# Patient Record
Sex: FEMALE | Race: WHITE | Employment: OTHER | URBAN - METROPOLITAN AREA
[De-identification: names, ages, dates, MRNs, and addresses within clinical notes are randomized per-mention and may not be internally consistent; named-entity substitution may affect disease eponyms.]

---

## 2017-02-08 ENCOUNTER — HOSPITAL ENCOUNTER (OUTPATIENT)
Dept: MRI IMAGING | Age: 52
Discharge: HOME | End: 2017-02-08
Attending: ORTHOPAEDIC SURGERY
Payer: MEDICAID

## 2017-02-08 DIAGNOSIS — M54.16 LUMBAR RADICULOPATHY: ICD-10-CM

## 2017-02-08 PROCEDURE — 72148 MRI LUMBAR SPINE W/O DYE: CPT

## 2017-04-26 RX ORDER — HYDROMORPHONE HYDROCHLORIDE 8 MG/1
8 TABLET ORAL
Qty: 60 TAB | Refills: 0 | Status: SHIPPED | OUTPATIENT
Start: 2017-04-26 | End: 2017-05-24 | Stop reason: SDUPTHER

## 2017-04-26 NOTE — TELEPHONE ENCOUNTER
PATIENT WAS CALLING CHECKING ON APPT TIME AND I TOLD HER 2:40PM. PT THOUGHT IT WAS 3:40PM. I TOLD HER SHE WOULD HAVE TO [address] IF SHE WAS 15MIN LATE AND SHE KNOWS SHE WILL BE. PLEASE PREPARE RX AND SHE WILL [address]. .. [address]    PATIENT REQUESTING REFILL ON DILAUDID 8MG #60  ONE PO Q8HRS PRN FOR SEVERE PAIN

## 2017-05-24 ENCOUNTER — OFFICE VISIT (OUTPATIENT)
Dept: NEUROLOGY | Age: 52
End: 2017-05-24

## 2017-05-24 VITALS
TEMPERATURE: 98.6 F | RESPIRATION RATE: 18 BRPM | OXYGEN SATURATION: 96 % | SYSTOLIC BLOOD PRESSURE: 119 MMHG | WEIGHT: 226 LBS | HEART RATE: 112 BPM | HEIGHT: 68 IN | DIASTOLIC BLOOD PRESSURE: 73 MMHG | BODY MASS INDEX: 34.25 KG/M2

## 2017-05-24 DIAGNOSIS — R00.0 TACHYCARDIA: ICD-10-CM

## 2017-05-24 DIAGNOSIS — Z76.0 MEDICATION REFILL: Primary | ICD-10-CM

## 2017-05-24 DIAGNOSIS — M54.42 CHRONIC BILATERAL LOW BACK PAIN WITH BILATERAL SCIATICA: ICD-10-CM

## 2017-05-24 DIAGNOSIS — R26.9 GAIT DISORDER: ICD-10-CM

## 2017-05-24 DIAGNOSIS — F11.90 CHRONIC, CONTINUOUS USE OF OPIOIDS: ICD-10-CM

## 2017-05-24 DIAGNOSIS — M54.41 CHRONIC BILATERAL LOW BACK PAIN WITH BILATERAL SCIATICA: ICD-10-CM

## 2017-05-24 DIAGNOSIS — M79.18 MYOFASCIAL MUSCLE PAIN: ICD-10-CM

## 2017-05-24 DIAGNOSIS — G89.29 CHRONIC BILATERAL LOW BACK PAIN WITH BILATERAL SCIATICA: ICD-10-CM

## 2017-05-24 DIAGNOSIS — G62.9 POLYNEUROPATHY: ICD-10-CM

## 2017-05-24 DIAGNOSIS — G89.4 CHRONIC PAIN DISORDER: ICD-10-CM

## 2017-05-24 DIAGNOSIS — M54.16 LUMBAR RADICULOPATHY: ICD-10-CM

## 2017-05-24 RX ORDER — METHYLPREDNISOLONE 4 MG/1
TABLET ORAL
Refills: 0 | COMMUNITY
Start: 2017-03-20 | End: 2022-05-15

## 2017-05-24 RX ORDER — BENZONATATE 100 MG/1
CAPSULE ORAL
Refills: 1 | COMMUNITY
Start: 2017-03-20

## 2017-05-24 RX ORDER — DIAZEPAM 10 MG/1
10 TABLET ORAL 2 TIMES DAILY
Qty: 60 TAB | Refills: 3 | Status: SHIPPED | OUTPATIENT
Start: 2017-05-24 | End: 2017-08-18 | Stop reason: SDUPTHER

## 2017-05-24 RX ORDER — TRAMADOL HYDROCHLORIDE 50 MG/1
TABLET ORAL
Refills: 0 | COMMUNITY
Start: 2017-05-05 | End: 2022-05-15

## 2017-05-24 RX ORDER — SENNOSIDES/DOCUSATE SODIUM 8.6MG-50MG
TABLET ORAL
Refills: 11 | COMMUNITY
Start: 2017-03-26

## 2017-05-24 RX ORDER — HYDROCODONE BITARTRATE AND ACETAMINOPHEN 10; 325 MG/1; MG/1
TABLET ORAL
Refills: 0 | COMMUNITY
Start: 2017-05-09 | End: 2022-05-15

## 2017-05-24 RX ORDER — ALBUTEROL SULFATE 90 UG/1
AEROSOL, METERED RESPIRATORY (INHALATION)
Refills: 2 | COMMUNITY
Start: 2017-04-29

## 2017-05-24 RX ORDER — DOXYCYCLINE 100 MG/1
CAPSULE ORAL
Refills: 0 | COMMUNITY
Start: 2017-05-03 | End: 2017-05-24 | Stop reason: ALTCHOICE

## 2017-05-24 RX ORDER — DIAZEPAM 10 MG/1
TABLET ORAL
Refills: 3 | COMMUNITY
Start: 2017-05-15 | End: 2017-05-24 | Stop reason: SDUPTHER

## 2017-05-24 RX ORDER — VALSARTAN AND HYDROCHLOROTHIAZIDE 80; 12.5 MG/1; MG/1
TABLET, FILM COATED ORAL
Refills: 5 | COMMUNITY
Start: 2017-05-11

## 2017-05-24 RX ORDER — CYCLOBENZAPRINE HCL 5 MG
TABLET ORAL
Refills: 0 | COMMUNITY
Start: 2017-05-09 | End: 2017-11-20 | Stop reason: SDUPTHER

## 2017-05-24 RX ORDER — METFORMIN HYDROCHLORIDE 500 MG/1
TABLET ORAL
Refills: 3 | COMMUNITY
Start: 2017-04-29

## 2017-05-24 RX ORDER — OXYCODONE AND ACETAMINOPHEN 5; 325 MG/1; MG/1
TABLET ORAL
Refills: 0 | COMMUNITY
Start: 2017-02-22 | End: 2022-05-15

## 2017-05-24 RX ORDER — LEVOFLOXACIN 500 MG/1
TABLET, FILM COATED ORAL
Refills: 0 | COMMUNITY
Start: 2017-03-20 | End: 2017-05-24 | Stop reason: ALTCHOICE

## 2017-05-24 RX ORDER — METHOCARBAMOL 500 MG/1
TABLET, FILM COATED ORAL
Refills: 0 | COMMUNITY
Start: 2017-05-03

## 2017-05-24 RX ORDER — INSULIN GLARGINE 100 [IU]/ML
INJECTION, SOLUTION SUBCUTANEOUS
Refills: 5 | COMMUNITY
Start: 2017-03-21

## 2017-05-24 RX ORDER — ESOMEPRAZOLE MAGNESIUM 20 MG/1
TABLET ORAL
Refills: 4 | COMMUNITY
Start: 2017-05-07

## 2017-05-24 RX ORDER — HYDROMORPHONE HYDROCHLORIDE 8 MG/1
8 TABLET ORAL
Qty: 60 TAB | Refills: 0 | Status: SHIPPED | OUTPATIENT
Start: 2017-05-24 | End: 2017-06-12 | Stop reason: SDUPTHER

## 2017-05-24 RX ORDER — GABAPENTIN 300 MG/1
CAPSULE ORAL
Refills: 3 | COMMUNITY
Start: 2017-03-09 | End: 2022-05-15

## 2017-05-24 NOTE — PROGRESS NOTES
Neurology Progress Note    NAME:  Velia Denver   :   1965   MRN:   [id number]     Date/Time:  2017  Subjective:      Velia Denver is a 47 y.o. female here today for follow up. Says pain the neck has been persistent. Pain is sharp in nature, aggravated by activity and radiates to the arms. Pain has been so severe thathat it disturbs patient's sleep. Back pain radiate down to the legs. Due to pain legs tend to give out at times. Has been having joint pains. Headache is throbbing in nature , associated with nausea and dizziness, frequency is variable. Denies difficulty swallowing or breathing, chest pain, diarrhea or constipation, dysuria, hematuria or hematochezia. Review of Systems:   Neurological ROS: positive for - dizziness, gait disturbance, headaches, impaired coordination/balance, numbness/tingling and weakness         []Unable to obtain  ROS due to  []mental status change  []sedated   []intubated    Medications reviewed:  Current Outpatient Prescriptions   Medication Sig Dispense Refill    ADVANCED GLUC METER TEST STRIP strip USE TO TEST BLOOD SUGAR UP TO TWICE A DAY AS DIRECTED  11    NEXIUM 24HR 20 mg TbEC TAKE 1 TABLET BY MOUTH EVERY DAY  4    BASAGLAR KWIKPEN 100 unit/mL (3 mL) inpn INJECT 30 UNITS UNDER THE SKIN DAILY  5    metFORMIN (GLUCOPHAGE) 500 mg tablet TAKE 1 TABLET BY MOUTH TWICE A DAY WITH MEALS  3    valsartan-hydroCHLOROthiazide (DIOVAN-HCT) 80-12.5 mg per tablet TAKE 1 TABLET BY MOUTH ONCE A DAY 30 DAY(S)  5    diazePAM (VALIUM) 10 mg tablet Take 1 Tab by mouth two (2) times a day. Max Daily Amount: 20 mg. 60 Tab 3    HYDROmorphone (DILAUDID) 8 mg tablet Take 1 Tab by mouth every eight (8) hours as needed for Pain. Max Daily Amount: 24 mg. 60 Tab 0    albuterol (PROVENTIL, VENTOLIN) 90 mcg/Actuation inhaler Take 2 Puffs by inhalation every six (6) hours as needed.       VENTOLIN HFA 90 mcg/actuation inhaler INHALE 1 TO 2 PUFFS AS NEEDED EVERY 4 HOURS  2    benzonatate (TESSALON) 100 mg capsule TAKE ONE CAPSULE BY MOUTH 3 TIMES A DAY FOR 10 DAYS  1    cyclobenzaprine (FLEXERIL) 5 mg tablet TAKE 1 TABLET BY MOUTH TWICE A DAY  0    gabapentin (NEURONTIN) 300 mg capsule TAKE 1 CAPSULE DAILY AS DIRECTED. TAKE 1 AT NIGHT FOR FIVE DAYS THEN 3 TIMES A DAY  3    HYDROcodone-acetaminophen (NORCO)  mg tablet TAKE 1 TABLET BY MOUTH 3 TIMES A DAY AS DIRECTED FOR 30 DAYS  0    methocarbamol (ROBAXIN) 500 mg tablet TAKE 2 TABLETS 4 TIMES A DAY AS NEEDED FOR MUSCLE SPASM  0    methylPREDNISolone (MEDROL DOSEPACK) 4 mg tablet TAKE AS DIRECTED FOR 6 DAYS  0    oxyCODONE-acetaminophen (PERCOCET) 5-325 mg per tablet TK 1   TO  2  TS  PO  Q  4  TO  6  H  PRF  PAIN  0    traMADol (ULTRAM) 50 mg tablet TAKE 1 OR 2 TABLETS EVERY 6 HOURS AS NEEDED FOR PAIN  0    HYDROcodone-acetaminophen (NORCO) 5-325 mg per tablet Take 1 Tab by mouth every four (4) hours as needed for Pain. Max Daily Amount: 6 Tabs. 12 Tab 0    cyclobenzaprine (FLEXERIL) 10 mg tablet Take 1 Tab by mouth three (3) times daily as needed for Muscle Spasm(s). 20 Tab 0    hydrochlorothiazide (HYDRODIURIL) 25 mg tablet Take 25 mg by mouth daily.  lisinopril (PRINIVIL, ZESTRIL) 20 mg tablet Take 20 mg by mouth daily.           Objective:   Vitals:  Vitals:    05/24/17 1327   BP: 119/73   Pulse: (!) 112   Resp: 18   Temp: 98.6 °F (37 °C)   TempSrc: Oral   SpO2: 96%   Weight: 226 lb (102.5 kg)   Height: 5' 8\" (1.727 m)   PainSc:   9   PainLoc: Neck               Lab Data Reviewed:  Lab Results   Component Value Date/Time    WBC 9.6 08/03/2010 04:10 PM    HCT 30.1 08/03/2010 04:10 PM    HGB 10.1 08/03/2010 04:10 PM    PLATELET 992 97/71/7729 04:10 PM       Lab Results   Component Value Date/Time    Sodium 134 08/03/2010 04:10 PM    Potassium 4.4 08/03/2010 04:10 PM    Chloride 98 08/03/2010 04:10 PM    CO2 33 08/03/2010 04:10 PM    Glucose 97 08/03/2010 04:10 PM    BUN 10 08/03/2010 04:10 PM    Creatinine 0.8 08/03/2010 04:10 PM    Calcium 7.9 08/03/2010 04:10 PM       No components found for: TROPQUANT    No results found for: GARY      Lab Results   Component Value Date/Time    Hemoglobin A1c 5.8 07/22/2010 12:16 PM        No results found for: B12LT, FOL, RBCF    No results found for: GARY, ANARX, ANAIGG, XBANA    No results found for: CHOL, CHOLPOCT, CHOLX, CHLST, CHOLV, HDL, HDLPOC, LDL, LDLCPOC, NLDLCT, DLDL, LDLC, DLDLP, VLDLC, VLDL, TGL, TGLX, TRIGL, [id number], TRIGP, TGLPOCT, CHHD, CHHDX      CT Results (recent):    Results from Hospital Encounter encounter on 02/13/15   CT ABD PELV W CONT   Narrative **Final Report**      ICD Codes / Adm. Diagnosis: 789.00  070.54 / Abdominal pain, unspecified si    Chronic hepatitis C without   Examination:  CT ABD PELVIS W CON  - [id number] - Feb 13 2015  1:29PM  Accession No:  [id number]  Reason:  Hepatitis C, chronic, Abdominal pain, Constipated      REPORT:  EXAM: CT ABDOMEN PELVIS WITH CONTRAST    INDICATION:  Abdominal pain with chronic hepatitis C.    COMPARISON: None. CONTRAST: 97 mL of Isovue-370. TECHNIQUE:   Multislice helical CT was performed from the diaphragm to the symphysis   pubis during uneventful rapid bolus intravenous contrast administration. Oral contrast was also administered . Contiguous 5 mm axial images   were reconstructed and lung and soft tissue windows were generated. Coronal   and sagittal reformations were generated. CT dose reduction was achieved   through use of a standardized protocol tailored for this examination and   automatic exposure control for dose modulation. FINDINGS:  The patient is obese. LOWER CHEST: The visualized portions of the lung bases are clear. ABDOMEN:  Liver: The liver is normal in size and contour with no focal abnormality. The attenuation of the liver is diffusely decreased throughout. Gallbladder and bile ducts: There is no calcified gallstone or biliary   dilatation.     Spleen: No abnormality. Pancreas: No abnormality. Adrenal glands: No abnormality. Kidneys: No abnormality. PELVIS:  Reproductive organs: The uterus is absent. Bladder: No abnormality. BOWEL AND MESENTERY: The small bowel is normal.  There is no mesenteric mass   or adenopathy. The appendix    . PERITONEUM: There is no ascites or free intraperitoneal air. RETROPERITONEUM: The aorta  tapers without aneurysm. There are numerous   small lymph nodes throughout the retroperitoneum. There is no   retroperitoneal adenopathy or mass. There is no pelvic mass or adenopathy. BONES AND SOFT TISSUES: The patient is status post L4-L5 fusion with an   interbody bone graft and rods and pedicle screws. There are degenerative   disc changes at L2-L3 and L5-S1. IMPRESSION:   1. No acute abdominal or pelvic abnormality identified. 2. Obesity with hepatic steatosis. No focal hepatic abnormality. 3. Status post lumbar spine fusion at L4-L5 with lumbar spondylosis. Signing/Reading Doctor: Nikia Ewing ([id number])    Approved: Nikia Ewing ([id number])  Feb 13 2015  1:38PM                                   MRI Results (recent):    Results from Hospital Encounter encounter on 02/08/17   MRI LUMB SPINE WO CONT   Narrative Indication: Lumbar radiculitis after previous back surgery    Exam: MRI of the lumbar spine. Sequences include sagittal and axial T1 and  T2-weighted images. Sagittal STIR. Comparisons: July 8, 2011 and Cara 10, 2011    Contrast: None. Findings: There is slight retrolisthesis of L3 on L4. Patient is post L4-L5  interbody fusion and posterior decompression. There is susceptibility artifact  dorsal to the L3-L4 disc space. There is multilevel endplate degenerative  change. Cord terminates normally posterior to L1. Paraspinous soft tissues are  within normal limits.     T12-L1: No stenosis    L1-L2: No stenosis    L2-L3: There is disc height loss and degeneration of this disc. There is a  diffuse disc bulge with slight narrowing of the canal. No foraminal narrowing    L3-L4: There is disc height loss with a diffuse disc bulge. There are bilateral  facet degenerative changes with thickening of the ligamentum flavum canal  stenosis is moderate which has progressed in the interval with narrowing of the  bilateral subarticular zones. There is moderate narrowing of the bilateral  foramen    L4-L5: This level has been decompressed without residual canal or foraminal  narrowing    L5-S1: There are bilateral facet degenerative changes with disc height loss and  a small disc osteophytic bulge. There is mild left greater than right foraminal  narrowing and mild narrowing of the canal, unchanged         Impression Impression:  1. Aggressive degeneration of the L3-L4 disc level now with moderate narrowing  of the canal and bilateral foramen  2. Postoperative changes L4-L5  3. Mild degenerative changes L2-3 and L5-S1 unchanged          IR Results (recent):  No results found for this or any previous visit. VAS/US Results (recent):  No results found for this or any previous visit. PHYSICAL EXAM:  General:    Alert, cooperative, painful distress, appears stated age. Head:   Normocephalic, without obvious abnormality, atraumatic. Eyes:   Conjunctivae/corneas clear. PERRLA  Nose:  Nares normal. No drainage or sinus tenderness. Throat:    Lips, mucosa, and tongue normal.  No Thrush  Neck:  Supple, symmetrical,  no adenopathy, thyroid: non tender    no carotid bruit and no JVD. Paraspinal tenderness  Back:    Symmetric,   CVA tenderness. Lungs:   Clear to auscultation bilaterally. No Wheezing or Rhonchi. No rales. Chest wall:  No tenderness or deformity. No Accessory muscle use. Heart:   Regular rate and rhythm,  no murmur, rub or gallop. Abdomen:   Soft, non-tender. Not distended. Bowel sounds normal. No masses  Extremities: Extremities normal, atraumatic, No cyanosis.   No edema. No clubbing  Skin:     Texture, turgor normal. No rashes or lesions. Not Jaundiced  Lymph nodes: Cervical, supraclavicular normal.  Psych:  Good insight. Not depressed. Not anxious or agitated. NEUROLOGICAL EXAM:  Appearance: The patient is well developed, well nourished, provides a coherent history and is in painful distress. Mental Status: Oriented to time, place and person. Mood and affect appropriate. Cranial Nerves:   Intact visual fields. Fundi are benign. SHERIF, EOM's full, no nystagmus, no ptosis. Facial sensation is normal. Corneal reflexes are intact. Facial movement is symmetric. Hearing is normal bilaterally. Palate is midline with normal sternocleidomastoid and trapezius muscles are normal. Tongue is midline. Motor:  5/5 strength in upper and 4+/5 lower proximal and distal muscles. Normal bulk and tone. No fasciculations. Reflexes:   Deep tendon reflexes 3+/4 and symmetrical.   Sensory:   Decreased sensation to touch, pinprick and vibration. Gait:  Unsteady gait. Ambulates with cane   Tremor:   No tremor noted. Cerebellar:  No cerebellar signs present. Neurovascular:  Normal heart sounds and regular rhythm, peripheral pulses intact, and no carotid bruits. Assesment  1. Medication refill    - diazePAM (VALIUM) 10 mg tablet; ; Refill: 3  - HYDROmorphone (DILAUDID) 8 mg tablet; Take 1 Tab by mouth every eight (8) hours as needed for Pain. Max Daily Amount: 24 mg. Dispense: 60 Tab; Refill: 0    2. Chronic, continuous use of opioids    - COMPLIANCE DRUG SCREEN/PRESCRIPTION MONITORING    ___________________________________________________  PLAN:Medications reviwed with patient  Patient notified that I will no longer be writing narcotics. ICD-10-CM ICD-9-CM    1. Medication refill Z76.0 V68.1 diazePAM (VALIUM) 10 mg tablet      HYDROmorphone (DILAUDID) 8 mg tablet   2. Chronic, continuous use of opioids F11.90 305.51 COMPLIANCE DRUG SCREEN/PRESCRIPTION MONITORING   3. Chronic pain disorder G89.4 338.4    4. Chronic bilateral low back pain with bilateral sciatica M54.42 724.2     M54.41 724.3     G89.29 338.29    5. Lumbar radiculopathy M54.16 724.4    6. Polyneuropathy G62.9 356.9    7. Gait disorder R26.9 781.2    8. Myofascial muscle pain M79.1 729.1    9. Tachycardia R00.0 785.0      Follow-up Disposition:  Return in about 3 months (around 8/24/2017).            ___________________________________________________    Total time spent with patient:  []15   []25   []35   [] __ minutes    Care Plan discussed with:    [x]Patient   []Family    []Care Manager   []Consultant/Specialist :    ___________________________________________________    Attending Physician: Regan Montesinos MD

## 2017-05-24 NOTE — MR AVS SNAPSHOT
Visit Information Date & Time Provider Department Dept. Phone Encounter #  
 5/24/2017  1:40 PM [facility] [phone] [id number] Follow-up Instructions Return in about 3 months (around 8/24/2017). Upcoming Health Maintenance Date Due Hepatitis C Screening 1965 Pneumococcal 19-64 Medium Risk (1 of 1 - PPSV23) 2/10/1983 DTaP/Tdap/Td series (1 - Tdap) 2/10/1985 PAP AKA CERVICAL CYTOLOGY 2/10/1985 BREAST CANCER SCRN MAMMOGRAM 2/10/2014 FOBT Q 1 YEAR AGE 50-75 2/10/2014 INFLUENZA AGE 9 TO ADULT 8/1/2017 Allergies as of 5/24/2017  Review Complete On: 5/24/2017 By: Juliaette Nyhan, MD  
  
 Severity Noted Reaction Type Reactions Chantix [Varenicline]  10/19/2010    Itching  
 Gabapentin  10/19/2010    Swelling Motrin [Ibuprofen]  10/19/2010    Swelling Current Immunizations  Never Reviewed No immunizations on file. Not reviewed this visit You Were Diagnosed With   
  
 Codes Comments Medication refill    -  Primary ICD-10-CM: Z76.0 ICD-9-CM: V68.1 Chronic, continuous use of opioids     ICD-10-CM: F11.90 ICD-9-CM: 305.51 Chronic pain disorder     ICD-10-CM: G89.4 ICD-9-CM: 338. 4 Chronic bilateral low back pain with bilateral sciatica     ICD-10-CM: M54.42, M54.41, G89.29 ICD-9-CM: 724.2, 724.3, 338.29 Lumbar radiculopathy     ICD-10-CM: M54.16 
ICD-9-CM: 724.4 Polyneuropathy     ICD-10-CM: G62.9 ICD-9-CM: 356.9 Gait disorder     ICD-10-CM: R26.9 ICD-9-CM: 509. 2 Myofascial muscle pain     ICD-10-CM: M79.1 ICD-9-CM: 729.1 Vitals BP Pulse Temp Resp Height(growth percentile) Weight(growth percentile) 119/73 (BP 1 Location: Right arm, BP Patient Position: Sitting) (!) 112 98.6 °F (37 °C) (Oral) 18 5' 8\" (1.727 m) 226 lb (102.5 kg) SpO2 BMI OB Status Smoking Status 96% 34.36 kg/m2 Hysterectomy Current Some Day Smoker BMI and BSA Data Body Mass Index Body Surface Area  
 34.36 kg/m 2 2.22 m 2 Preferred Pharmacy Pharmacy Name Phone [facility] [address] [place] [phone] Your Updated Medication List  
  
   
This list is accurate as of: 5/24/17  1:50 PM.  Always use your most recent med list.  
  
  
  
  
 ADVANCED GLUC METER TEST STRIP strip Generic drug:  glucose blood VI test strips USE TO TEST BLOOD SUGAR UP TO TWICE A DAY AS DIRECTED  
  
 albuterol 90 mcg/actuation inhaler Commonly known as:  Stef Quinton Take 2 Puffs by inhalation every six (6) hours as needed. BASAGLAR KWIKPEN 100 unit/mL (3 mL) Inpn Generic drug:  insulin glargine INJECT 30 UNITS UNDER THE SKIN DAILY  
  
 benzonatate 100 mg capsule Commonly known as:  TESSALON  
TAKE ONE CAPSULE BY MOUTH 3 TIMES A DAY FOR 10 DAYS * cyclobenzaprine 10 mg tablet Commonly known as:  FLEXERIL Take 1 Tab by mouth three (3) times daily as needed for Muscle Spasm(s). * cyclobenzaprine 5 mg tablet Commonly known as:  FLEXERIL  
TAKE 1 TABLET BY MOUTH TWICE A DAY  
  
 diazePAM 10 mg tablet Commonly known as:  VALIUM Take 1 Tab by mouth two (2) times a day. Max Daily Amount: 20 mg.  
  
 gabapentin 300 mg capsule Commonly known as:  NEURONTIN  
TAKE 1 CAPSULE DAILY AS DIRECTED. TAKE 1 AT NIGHT FOR FIVE DAYS THEN 3 TIMES A DAY  
  
 hydroCHLOROthiazide 25 mg tablet Commonly known as:  HYDRODIURIL Take 25 mg by mouth daily. * HYDROcodone-acetaminophen 5-325 mg per tablet Commonly known as:  Alexander Kilts Take 1 Tab by mouth every four (4) hours as needed for Pain. Max Daily Amount: 6 Tabs. * HYDROcodone-acetaminophen  mg tablet Commonly known as:  NORCO  
TAKE 1 TABLET BY MOUTH 3 TIMES A DAY AS DIRECTED FOR 30 DAYS HYDROmorphone 8 mg tablet Commonly known as:  DILAUDID Take 1 Tab by mouth every eight (8) hours as needed for Pain. Max Daily Amount: 24 mg.  
  
 lisinopril 20 mg tablet Commonly known as:  Walker Ku Take 20 mg by mouth daily. metFORMIN 500 mg tablet Commonly known as:  GLUCOPHAGE  
TAKE 1 TABLET BY MOUTH TWICE A DAY WITH MEALS  
  
 methocarbamol 500 mg tablet Commonly known as:  ROBAXIN  
TAKE 2 TABLETS 4 TIMES A DAY AS NEEDED FOR MUSCLE SPASM  
  
 methylPREDNISolone 4 mg tablet Commonly known as:  MEDROL DOSEPACK  
TAKE AS DIRECTED FOR 6 DAYS NexIUM 24HR 20 mg Tbec Generic drug:  esomeprazole magnesium TAKE 1 TABLET BY MOUTH EVERY DAY  
  
 oxyCODONE-acetaminophen 5-325 mg per tablet Commonly known as:  PERCOCET TK 1   TO  2  TS  PO  Q  4  TO  6  H  PRF  PAIN  
  
 traMADol 50 mg tablet Commonly known as:  ULTRAM  
TAKE 1 OR 2 TABLETS EVERY 6 HOURS AS NEEDED FOR PAIN  
  
 valsartan-hydroCHLOROthiazide 80-12.5 mg per tablet Commonly known as:  DIOVAN-HCT  
TAKE 1 TABLET BY MOUTH ONCE A DAY 30 DAY(S) VENTOLIN HFA 90 mcg/actuation inhaler Generic drug:  albuterol INHALE 1 TO 2 PUFFS AS NEEDED EVERY 4 HOURS * Notice: This list has 4 medication(s) that are the same as other medications prescribed for you. Read the directions carefully, and ask your doctor or other care provider to review them with you. Prescriptions Printed Refills  
 diazePAM (VALIUM) 10 mg tablet 3 Sig: Take 1 Tab by mouth two (2) times a day. Max Daily Amount: 20 mg.  
 Class: Print Route: Oral  
 HYDROmorphone (DILAUDID) 8 mg tablet 0 Sig: Take 1 Tab by mouth every eight (8) hours as needed for Pain. Max Daily Amount: 24 mg. Class: Print Route: Oral  
  
We Performed the Following [facility] MONITORING [[id number] Custom] Follow-up Instructions Return in about 3 months (around 8/24/2017). Introducing [facility]! [facility] introduces ascentify patient portal. Now you can access parts of your medical record, email your doctor's office, and request medication refills online. 1. In your internet browser, go to [email or web address] 2. Click on the First Time User? Click Here link in the Sign In box. You will see the New Member Sign Up page. 3. Enter your ascentify Access Code exactly as it appears below. You will not need to use this code after youve completed the sign-up process. If you do not sign up before the expiration date, you must request a new code. · ascentify Access Code: [email or web address] Expires: 8/22/2017  1:17 PM 
 
4. Enter the last four digits of your Social Security Number (xxxx) and Date of Birth (mm/dd/yyyy) as indicated and click Submit. You will be taken to the next sign-up page. 5. Create a ascentify ID. This will be your ascentify login ID and cannot be changed, so think of one that is secure and easy to remember. 6. Create a ascentify password. You can change your password at any time. 7. Enter your Password Reset Question and Answer. This can be used at a later time if you forget your password. 8. Enter your e-mail address. You will receive e-mail notification when new information is available in [address] 9. Click Sign Up. You can now view and download portions of your medical record. 10. Click the Download Summary menu link to download a portable copy of your medical information. If you have questions, please visit the Frequently Asked Questions section of the ascentify website. Remember, ascentify is NOT to be used for urgent needs. For medical emergencies, dial 911. Now available from your iPhone and Android! Please provide this summary of care documentation to your next provider. Your primary care clinician is listed as Edvin Fischer. If you have any questions after today's visit, please call [phone].

## 2017-06-01 LAB — DRUGS UR: NORMAL

## 2017-06-12 DIAGNOSIS — Z76.0 MEDICATION REFILL: ICD-10-CM

## 2017-06-12 RX ORDER — HYDROMORPHONE HYDROCHLORIDE 8 MG/1
8 TABLET ORAL
Qty: 60 TAB | Refills: 0 | Status: SHIPPED | OUTPATIENT
Start: 2017-06-12 | End: 2017-07-03 | Stop reason: SDUPTHER

## 2017-06-12 NOTE — TELEPHONE ENCOUNTER
Last filled on 5/24/17. Patient knows not due, still looking for pain specialist..   She will  on 6/20/17., If you call her you can just let her know that will be ready on 6/20/17

## 2017-08-18 ENCOUNTER — OFFICE VISIT (OUTPATIENT)
Dept: NEUROLOGY | Age: 52
End: 2017-08-18

## 2017-08-18 VITALS
RESPIRATION RATE: 18 BRPM | DIASTOLIC BLOOD PRESSURE: 122 MMHG | HEART RATE: 101 BPM | BODY MASS INDEX: 33.46 KG/M2 | TEMPERATURE: 96.2 F | HEIGHT: 68 IN | OXYGEN SATURATION: 96 % | WEIGHT: 220.8 LBS | SYSTOLIC BLOOD PRESSURE: 145 MMHG

## 2017-08-18 DIAGNOSIS — M54.2 NECK PAIN: Primary | ICD-10-CM

## 2017-08-18 DIAGNOSIS — Z76.0 MEDICATION REFILL: ICD-10-CM

## 2017-08-18 DIAGNOSIS — M54.12 CERVICAL RADICULOPATHY: ICD-10-CM

## 2017-08-18 DIAGNOSIS — G89.4 CHRONIC PAIN SYNDROME: ICD-10-CM

## 2017-08-18 DIAGNOSIS — G44.86 CERVICOGENIC HEADACHE: ICD-10-CM

## 2017-08-18 DIAGNOSIS — F11.90 CHRONIC, CONTINUOUS USE OF OPIOIDS: ICD-10-CM

## 2017-08-18 DIAGNOSIS — G62.9 POLYNEUROPATHY: ICD-10-CM

## 2017-08-18 DIAGNOSIS — M79.18 MYOFASCIAL MUSCLE PAIN: ICD-10-CM

## 2017-08-18 RX ORDER — IBUPROFEN 800 MG/1
800 TABLET ORAL
Qty: 40 TAB | Refills: 3 | Status: SHIPPED | OUTPATIENT
Start: 2017-08-18

## 2017-08-18 RX ORDER — DIAZEPAM 10 MG/1
10 TABLET ORAL 2 TIMES DAILY
Qty: 60 TAB | Refills: 3 | Status: SHIPPED | OUTPATIENT
Start: 2017-08-18 | End: 2017-11-20 | Stop reason: SDUPTHER

## 2017-08-18 RX ORDER — HYDROMORPHONE HYDROCHLORIDE 8 MG/1
8 TABLET ORAL
Qty: 60 TAB | Refills: 0 | Status: CANCELLED | OUTPATIENT
Start: 2017-08-18

## 2017-08-18 RX ORDER — HYDROMORPHONE HYDROCHLORIDE 8 MG/1
8 TABLET ORAL
Qty: 60 TAB | Refills: 0 | Status: SHIPPED | OUTPATIENT
Start: 2017-08-18 | End: 2017-11-20 | Stop reason: SDUPTHER

## 2017-08-18 RX ORDER — HYDROMORPHONE HYDROCHLORIDE 4 MG/1
4 TABLET ORAL
Qty: 60 TAB | Refills: 0 | Status: SHIPPED | OUTPATIENT
Start: 2017-09-18 | End: 2017-10-10 | Stop reason: SDUPTHER

## 2017-08-18 NOTE — MR AVS SNAPSHOT
Visit Information Date & Time Provider Department Dept. Phone Encounter #  
 8/18/2017 10:40 AM Dylan Be MD [facility] at [facility] ([facility] [phone] [id number] Follow-up Instructions Return in about 3 months (around 11/18/2017). Your Appointments 9/1/2017 12:00 PM  
New Patient with Ashu Cummins MD  
[facility] Appt Note: NP/liver disease/referred by Dr. Valerie Escalante [address] [facility] [phone] [facility] [address] [facility] [address]  
[address] [facility] [place] [address] Upcoming Health Maintenance Date Due Hepatitis C Screening 1965 Pneumococcal 19-64 Medium Risk (1 of 1 - PPSV23) 2/10/1983 DTaP/Tdap/Td series (1 - Tdap) 2/10/1985 PAP AKA CERVICAL CYTOLOGY 2/10/1985 BREAST CANCER SCRN MAMMOGRAM 2/10/2014 FOBT Q 1 YEAR AGE 50-75 2/10/2014 INFLUENZA AGE 9 TO ADULT 8/1/2017 Allergies as of 8/18/2017  Review Complete On: 8/18/2017 By: Colletta Epley, MD  
  
 Severity Noted Reaction Type Reactions Chantix [Varenicline]  10/19/2010    Itching  
 Gabapentin  10/19/2010    Swelling Motrin [Ibuprofen]  10/19/2010    Swelling Current Immunizations  Never Reviewed No immunizations on file. Not reviewed this visit You Were Diagnosed With   
  
 Codes Comments Neck pain    -  Primary ICD-10-CM: M54.2 ICD-9-CM: 723.1 Medication refill     ICD-10-CM: Z76.0 ICD-9-CM: V68.1 Chronic pain syndrome     ICD-10-CM: G89.4 ICD-9-CM: 338. 4 Chronic, continuous use of opioids     ICD-10-CM: F11.90 ICD-9-CM: 305.51 Cervicogenic headache     ICD-10-CM: Randee Phi ICD-9-CM: 784.0 Cervical radiculopathy     ICD-10-CM: M54.12 
ICD-9-CM: 723.4 Myofascial muscle pain     ICD-10-CM: M79.1 ICD-9-CM: 729.1 [facility] ([facility]     ICD-10-CM: G62.9 ICD-9-CM: 356.9 Vitals BP Pulse Temp Resp Height(growth percentile) Weight(growth percentile) (!) 145/122 (!) 101 96.2 °F (35.7 °C) (Oral) 18 5' 8\" (1.727 m) 220 lb 12.8 oz (100.2 kg) SpO2 BMI OB Status Smoking Status 96% 33.57 kg/m2 Hysterectomy Current Some Day Smoker Vitals History BMI and BSA Data Body Mass Index Body Surface Area  
 33.57 kg/m 2 2.19 m 2 Preferred Pharmacy Pharmacy Name Phone [facility] [address] [place] [address] [address] [facility] [phone] Your Updated Medication List  
  
   
This list is accurate as of: 8/18/17 11:33 AM.  Always use your most recent med list.  
  
  
  
  
 ADVANCED GLUC METER TEST STRIP strip Generic drug:  glucose blood VI test strips USE TO TEST BLOOD SUGAR UP TO TWICE A DAY AS DIRECTED  
  
 albuterol 90 mcg/actuation inhaler Commonly known as:  Izella Lacrosse Take 2 Puffs by inhalation every six (6) hours as needed. BASAGLAR KWIKPEN 100 unit/mL (3 mL) Inpn Generic drug:  insulin glargine INJECT 30 UNITS UNDER THE SKIN DAILY  
  
 benzonatate 100 mg capsule Commonly known as:  TESSALON  
TAKE ONE CAPSULE BY MOUTH 3 TIMES A DAY FOR 10 DAYS * cyclobenzaprine 10 mg tablet Commonly known as:  FLEXERIL Take 1 Tab by mouth three (3) times daily as needed for Muscle Spasm(s). * cyclobenzaprine 5 mg tablet Commonly known as:  FLEXERIL  
TAKE 1 TABLET BY MOUTH TWICE A DAY  
  
 diazePAM 10 mg tablet Commonly known as:  VALIUM Take 1 Tab by mouth two (2) times a day. Max Daily Amount: 20 mg.  
  
 gabapentin 300 mg capsule Commonly known as:  NEURONTIN  
TAKE 1 CAPSULE DAILY AS DIRECTED. TAKE 1 AT NIGHT FOR FIVE DAYS THEN 3 TIMES A DAY  
  
 hydroCHLOROthiazide 25 mg tablet Commonly known as:  HYDRODIURIL Take 25 mg by mouth daily. * HYDROcodone-acetaminophen 5-325 mg per tablet Commonly known as:  Eva Law  
 Take 1 Tab by mouth every four (4) hours as needed for Pain. Max Daily Amount: 6 Tabs. * HYDROcodone-acetaminophen  mg tablet Commonly known as:  NORCO  
TAKE 1 TABLET BY MOUTH 3 TIMES A DAY AS DIRECTED FOR 30 DAYS HYDROmorphone 8 mg tablet Commonly known as:  DILAUDID Take 1 Tab by mouth every eight (8) hours as needed for Pain. Max Daily Amount: 24 mg.  
  
 lisinopril 20 mg tablet Commonly known as:  Pecola Cypher Take 20 mg by mouth daily. metFORMIN 500 mg tablet Commonly known as:  GLUCOPHAGE  
TAKE 1 TABLET BY MOUTH TWICE A DAY WITH MEALS  
  
 methocarbamol 500 mg tablet Commonly known as:  ROBAXIN  
TAKE 2 TABLETS 4 TIMES A DAY AS NEEDED FOR MUSCLE SPASM  
  
 methylPREDNISolone 4 mg tablet Commonly known as:  MEDROL DOSEPACK  
TAKE AS DIRECTED FOR 6 DAYS NexIUM 24HR 20 mg Tbec Generic drug:  esomeprazole magnesium TAKE 1 TABLET BY MOUTH EVERY DAY  
  
 oxyCODONE-acetaminophen 5-325 mg per tablet Commonly known as:  PERCOCET TK 1   TO  2  TS  PO  Q  4  TO  6  H  PRF  PAIN  
  
 traMADol 50 mg tablet Commonly known as:  ULTRAM  
TAKE 1 OR 2 TABLETS EVERY 6 HOURS AS NEEDED FOR PAIN  
  
 valsartan-hydroCHLOROthiazide 80-12.5 mg per tablet Commonly known as:  DIOVAN-HCT  
TAKE 1 TABLET BY MOUTH ONCE A DAY 30 DAY(S) VENTOLIN HFA 90 mcg/actuation inhaler Generic drug:  albuterol INHALE 1 TO 2 PUFFS AS NEEDED EVERY 4 HOURS * Notice: This list has 4 medication(s) that are the same as other medications prescribed for you. Read the directions carefully, and ask your doctor or other care provider to review them with you. We Performed the Following [address] [place] [address] MONITORING [[id number] Custom] Follow-up Instructions Return in about 3 months (around 11/18/2017). Introducing [facility]!    
 [facility] introduces RentablesÂ® patient portal. Now you can access parts of your medical record, email your doctor's office, and request medication refills online. 1. In your internet browser, go to [email or web address] 2. Click on the First Time User? Click Here link in the Sign In box. You will see the New Member Sign Up page. 3. Enter your Yasuu Access Code exactly as it appears below. You will not need to use this code after youve completed the sign-up process. If you do not sign up before the expiration date, you must request a new code. · Yasuu Access Code: [email or web address] Expires: 8/22/2017  1:17 PM 
 
4. Enter the last four digits of your Social Security Number (xxxx) and Date of Birth (mm/dd/yyyy) as indicated and click Submit. You will be taken to the next sign-up page. 5. Create a Yasuu ID. This will be your Yasuu login ID and cannot be changed, so think of one that is secure and easy to remember. 6. Create a Yasuu password. You can change your password at any time. 7. Enter your Password Reset Question and Answer. This can be used at a later time if you forget your password. 8. Enter your e-mail address. You will receive e-mail notification when new information is available in [address] 9. Click Sign Up. You can now view and download portions of your medical record. 10. Click the Download Summary menu link to download a portable copy of your medical information. If you have questions, please visit the Frequently Asked Questions section of the Yasuu website. Remember, Yasuu is NOT to be used for urgent needs. For medical emergencies, dial 911. Now available from your iPhone and Android! Please provide this summary of care documentation to your next provider. Your primary care clinician is listed as [facility]. If you have any questions after today's visit, please call [phone].

## 2017-08-18 NOTE — PROGRESS NOTES
Neurology Progress Note    NAME:  Brent Villafana   :   1965   MRN:   [id number]     Date/Time:  2017  Subjective:      Brent Villafana is a 47 y.o. female here today for  follow up. Says she continues to have persistent neck pain, pain is sharp in nature, and goes down the arm, activity makes the pain worse,on a scale of 1-10, patient rates the pain between 8 and 9. She said that she is still trying to get int pain specialist .  Headache is throbbing in nature, frequency of about 3-4 x/week, stress and neck pain makes the headache worse, headache associated with dizziness, nausea and occasional vomiting  Review of Systems:   Constitutional: positive for fatigue  Eyes: negative  Ears, nose, mouth, throat, and face: negative  Respiratory: negative  Cardiovascular: negative  Gastrointestinal: negative  Musculoskeletal:positive for myalgias, arthralgias, stiff joints, neck pain, back pain, muscle weakness and bone pain  Neurological: positive for headaches, dizziness, paresthesia, gait problems and weakness  Behavioral/Psych: negative  Endocrine: negative  Allergic/Immunologic: negative        Medications reviewed:  Current Outpatient Prescriptions   Medication Sig Dispense Refill    HYDROmorphone (DILAUDID) 8 mg tablet Take 1 Tab by mouth every eight (8) hours as needed for Pain.  Max Daily Amount: 24 mg. 60 Tab 0    VENTOLIN HFA 90 mcg/actuation inhaler INHALE 1 TO 2 PUFFS AS NEEDED EVERY 4 HOURS  2    benzonatate (TESSALON) 100 mg capsule TAKE ONE CAPSULE BY MOUTH 3 TIMES A DAY FOR 10 DAYS  1    ADVANCED GLUC METER TEST STRIP strip USE TO TEST BLOOD SUGAR UP TO TWICE A DAY AS DIRECTED  11    NEXIUM 24HR 20 mg TbEC TAKE 1 TABLET BY MOUTH EVERY DAY  4    BASAGLAR KWIKPEN 100 unit/mL (3 mL) inpn INJECT 30 UNITS UNDER THE SKIN DAILY  5    valsartan-hydroCHLOROthiazide (DIOVAN-HCT) 80-12.5 mg per tablet TAKE 1 TABLET BY MOUTH ONCE A DAY 30 DAY(S)  5    diazePAM (VALIUM) 10 mg tablet Take 1 Tab by mouth two (2) times a day. Max Daily Amount: 20 mg. 60 Tab 3    lisinopril (PRINIVIL, ZESTRIL) 20 mg tablet Take 20 mg by mouth daily.  albuterol (PROVENTIL, VENTOLIN) 90 mcg/Actuation inhaler Take 2 Puffs by inhalation every six (6) hours as needed.  cyclobenzaprine (FLEXERIL) 5 mg tablet TAKE 1 TABLET BY MOUTH TWICE A DAY  0    gabapentin (NEURONTIN) 300 mg capsule TAKE 1 CAPSULE DAILY AS DIRECTED. TAKE 1 AT NIGHT FOR FIVE DAYS THEN 3 TIMES A DAY  3    HYDROcodone-acetaminophen (NORCO)  mg tablet TAKE 1 TABLET BY MOUTH 3 TIMES A DAY AS DIRECTED FOR 30 DAYS  0    metFORMIN (GLUCOPHAGE) 500 mg tablet TAKE 1 TABLET BY MOUTH TWICE A DAY WITH MEALS  3    methocarbamol (ROBAXIN) 500 mg tablet TAKE 2 TABLETS 4 TIMES A DAY AS NEEDED FOR MUSCLE SPASM  0    methylPREDNISolone (MEDROL DOSEPACK) 4 mg tablet TAKE AS DIRECTED FOR 6 DAYS  0    oxyCODONE-acetaminophen (PERCOCET) 5-325 mg per tablet TK 1   TO  2  TS  PO  Q  4  TO  6  H  PRF  PAIN  0    traMADol (ULTRAM) 50 mg tablet TAKE 1 OR 2 TABLETS EVERY 6 HOURS AS NEEDED FOR PAIN  0    HYDROcodone-acetaminophen (NORCO) 5-325 mg per tablet Take 1 Tab by mouth every four (4) hours as needed for Pain. Max Daily Amount: 6 Tabs. 12 Tab 0    cyclobenzaprine (FLEXERIL) 10 mg tablet Take 1 Tab by mouth three (3) times daily as needed for Muscle Spasm(s). 20 Tab 0    hydrochlorothiazide (HYDRODIURIL) 25 mg tablet Take 25 mg by mouth daily.           Objective:   Vitals:  Vitals:    08/18/17 1058 08/18/17 1100   BP: (!) 156/93 (!) 145/122   Pulse: (!) 104 (!) 101   Resp: 18    Temp: 96.2 °F (35.7 °C)    TempSrc: Oral    SpO2: 96%    Weight: 220 lb 12.8 oz (100.2 kg)    Height: 5' 8\" (1.727 m)    PainSc:   8    PainLoc: Neck                Lab Data Reviewed:  Lab Results   Component Value Date/Time    WBC 9.6 08/03/2010 04:10 PM    HCT 30.1 08/03/2010 04:10 PM    HGB 10.1 08/03/2010 04:10 PM    PLATELET 591 89/83/4349 04:10 PM       Lab Results   Component Value Date/Time    Sodium 134 08/03/2010 04:10 PM    Potassium 4.4 08/03/2010 04:10 PM    Chloride 98 08/03/2010 04:10 PM    CO2 33 08/03/2010 04:10 PM    Glucose 97 08/03/2010 04:10 PM    BUN 10 08/03/2010 04:10 PM    Creatinine 0.8 08/03/2010 04:10 PM    Calcium 7.9 08/03/2010 04:10 PM       No components found for: TROPQUANT    No results found for: GARY      Lab Results   Component Value Date/Time    Hemoglobin A1c 5.8 07/22/2010 12:16 PM        No results found for: B12LT, FOL, RBCF    No results found for: GARY, ANARX, ANAIGG, XBANA    No results found for: CHOL, CHOLPOCT, CHOLX, CHLST, CHOLV, HDL, HDLPOC, LDL, LDLCPOC, LDLC, DLDLP, VLDLC, VLDL, TGLX, TRIGL, TRIGP, TGLPOCT, CHHD, CHHDX      CT Results (recent):    Results from Hospital Encounter encounter on 02/13/15   CT ABD PELV W CONT   Narrative **Final Report**      ICD Codes / Adm. Diagnosis: 789.00  070.54 / Abdominal pain, unspecified si    Chronic hepatitis C without   Examination:  CT ABD PELVIS W CON  - [id number] - Feb 13 2015  1:29PM  Accession No:  [id number]  Reason:  Hepatitis C, chronic, Abdominal pain, Constipated      REPORT:  EXAM: CT ABDOMEN PELVIS WITH CONTRAST    INDICATION:  Abdominal pain with chronic hepatitis C.    COMPARISON: None. CONTRAST: 97 mL of Isovue-370. TECHNIQUE:   Multislice helical CT was performed from the diaphragm to the symphysis   pubis during uneventful rapid bolus intravenous contrast administration. Oral contrast was also administered . Contiguous 5 mm axial images   were reconstructed and lung and soft tissue windows were generated. Coronal   and sagittal reformations were generated. CT dose reduction was achieved   through use of a standardized protocol tailored for this examination and   automatic exposure control for dose modulation. FINDINGS:  The patient is obese. LOWER CHEST: The visualized portions of the lung bases are clear. ABDOMEN:  Liver:  The liver is normal in size and contour with no focal abnormality. The attenuation of the liver is diffusely decreased throughout. Gallbladder and bile ducts: There is no calcified gallstone or biliary   dilatation. Spleen: No abnormality. Pancreas: No abnormality. Adrenal glands: No abnormality. Kidneys: No abnormality. PELVIS:  Reproductive organs: The uterus is absent. Bladder: No abnormality. BOWEL AND MESENTERY: The small bowel is normal.  There is no mesenteric mass   or adenopathy. The appendix    . PERITONEUM: There is no ascites or free intraperitoneal air. RETROPERITONEUM: The aorta  tapers without aneurysm. There are numerous   small lymph nodes throughout the retroperitoneum. There is no   retroperitoneal adenopathy or mass. There is no pelvic mass or adenopathy. BONES AND SOFT TISSUES: The patient is status post L4-L5 fusion with an   interbody bone graft and rods and pedicle screws. There are degenerative   disc changes at L2-L3 and L5-S1. IMPRESSION:   1. No acute abdominal or pelvic abnormality identified. 2. Obesity with hepatic steatosis. No focal hepatic abnormality. 3. Status post lumbar spine fusion at L4-L5 with lumbar spondylosis. Signing/Reading Doctor: Nohelia Del Cid ([id number])    Approved: Nohelia Del Cid ([id number])  Feb 13 2015  1:38PM                                   MRI Results (recent):    Results from Hospital Encounter encounter on 02/08/17   MRI LUMB SPINE WO CONT   Narrative Indication: Lumbar radiculitis after previous back surgery    Exam: MRI of the lumbar spine. Sequences include sagittal and axial T1 and  T2-weighted images. Sagittal STIR. Comparisons: July 8, 2011 and Cara 10, 2011    Contrast: None. Findings: There is slight retrolisthesis of L3 on L4. Patient is post L4-L5  interbody fusion and posterior decompression. There is susceptibility artifact  dorsal to the L3-L4 disc space.  There is multilevel endplate degenerative  change. Cord terminates normally posterior to L1. Paraspinous soft tissues are  within normal limits. T12-L1: No stenosis    L1-L2: No stenosis    L2-L3: There is disc height loss and degeneration of this disc. There is a  diffuse disc bulge with slight narrowing of the canal. No foraminal narrowing    L3-L4: There is disc height loss with a diffuse disc bulge. There are bilateral  facet degenerative changes with thickening of the ligamentum flavum canal  stenosis is moderate which has progressed in the interval with narrowing of the  bilateral subarticular zones. There is moderate narrowing of the bilateral  foramen    L4-L5: This level has been decompressed without residual canal or foraminal  narrowing    L5-S1: There are bilateral facet degenerative changes with disc height loss and  a small disc osteophytic bulge. There is mild left greater than right foraminal  narrowing and mild narrowing of the canal, unchanged         Impression Impression:  1. Aggressive degeneration of the L3-L4 disc level now with moderate narrowing  of the canal and bilateral foramen  2. Postoperative changes L4-L5  3. Mild degenerative changes L2-3 and L5-S1 unchanged          IR Results (recent):  No results found for this or any previous visit. VAS/US Results (recent):  No results found for this or any previous visit. PHYSICAL EXAM:  General:    Alert, cooperative, no distress, appears stated age. Head:   Normocephalic, without obvious abnormality, atraumatic. Eyes:   Conjunctivae/corneas clear. PERRLA  Nose:  Nares normal. No drainage or sinus tenderness. Throat:    Lips, mucosa, and tongue normal.  No Thrush  Neck:  Supple, symmetrical,  no adenopathy, thyroid: non tender    no carotid bruit and no JVD. Paraspinal spasm  Back:    Symmetric,  No CVA tenderness. Lungs:   Clear to auscultation bilaterally. No Wheezing or Rhonchi. No rales. Chest wall:  No tenderness or deformity.  No Accessory muscle use.  Heart:   Regular rate and rhythm,  no murmur, rub or gallop. Abdomen:   Soft, non-tender. Not distended. Bowel sounds normal. No masses  Extremities: Extremities normal, atraumatic, No cyanosis. No edema. No clubbing  Skin:     Texture, turgor normal. No rashes or lesions. Not Jaundiced  Lymph nodes: Cervical, supraclavicular normal.  Psych:  Good insight. Not depressed. Not anxious or agitated. NEUROLOGICAL EXAM:  Appearance: The patient is well developed, well nourished, provides a coherent history and is in no acute distress. Mental Status: Oriented to time, place and person. Mood and affect appropriate. Cranial Nerves:   Intact visual fields. Fundi are benign. SHERIF, EOM's full, no nystagmus, no ptosis. Facial sensation is normal. Corneal reflexes are intact. Facial movement is symmetric. Hearing is normal bilaterally. Palate is midline with normal sternocleidomastoid and trapezius muscles are normal. Tongue is midline. Motor:  5/5 strength in upper and lower proximal and distal muscles. Normal bulk and tone. No fasciculations. Reflexes:   Deep tendon reflexes 2+/4 and symmetrical.   Sensory:   Decreased sensation to touch, pinprick and vibration. Gait:  Slightly unstedy gait. Tremor:   No tremor noted. Cerebellar:  No cerebellar signs present. Neurovascular:  Normal heart sounds and regular rhythm, peripheral pulses intact, and no carotid bruits. Assesment  1. Medication refill    - diazePAM (VALIUM) 10 mg tablet; Take 1 Tab by mouth two (2) times a day. Max Daily Amount: 20 mg. Dispense: 60 Tab; Refill: 3  - HYDROmorphone (DILAUDID) 8 mg tablet; Take 1 Tab by mouth every eight (8) hours as needed for Pain. Max Daily Amount: 24 mg. Dispense: 60 Tab; Refill: 0    2. Chronic pain syndrome    - COMPLIANCE DRUG SCREEN/PRESCRIPTION MONITORING  - HYDROmorphone (DILAUDID) 8 mg tablet; Take 1 Tab by mouth every eight (8) hours as needed for Pain. Max Daily Amount: 24 mg. Dispense: 60 Tab; Refill: 0    3. Neck pain    - COMPLIANCE DRUG SCREEN/PRESCRIPTION MONITORING    4. Chronic, continuous use of opioids    - COMPLIANCE DRUG SCREEN/PRESCRIPTION MONITORING    ___________________________________________________  PLAN:Mwedication reviewed with patient. Will taper and wean narcotics if patient cannot get to pain specialist  Refer to physical therapy. Advised on the dangers of tobacco abuse  Advised on the benefits of discontinuation   Advised on available treatments. 10 minutes spent on counseling. ICD-10-CM ICD-9-CM    1. Neck pain M54.2 723.1 COMPLIANCE DRUG SCREEN/PRESCRIPTION MONITORING   2. Medication refill Z76.0 V68.1 diazePAM (VALIUM) 10 mg tablet   3. Chronic pain syndrome G89.4 338.4 COMPLIANCE DRUG SCREEN/PRESCRIPTION MONITORING   4. Chronic, continuous use of opioids F11.90 305.51 COMPLIANCE DRUG SCREEN/PRESCRIPTION MONITORING   5. Cervicogenic headache R51 784.0    6. Cervical radiculopathy M54.12 723.4    7. Myofascial muscle pain M79.1 729.1    8. Polyneuropathy ([address] G62.9 356.9      Follow-up Disposition:  Return in about 3 months (around 11/18/2017).            ___________________________________________________    Total time spent with patient:  []15   []25   []35   [] __ minutes    Care Plan discussed with:    [x]Patient   []Family    []Care Manager   []Consultant/Specialist :    ___________________________________________________    Attending Physician: Su Bhagat MD

## 2017-08-18 NOTE — PROGRESS NOTES
Chief Complaint   Patient presents with    Back Pain    Medication Refill     1. Have you been to the ER, urgent care clinic since your last visit? Hospitalized since your last visit? [facility] 8/9/17    2. Have you seen or consulted any other health care providers outside of the [address] [place] [address] since your last visit? Include any pap smears or colon screening. [facility]    Patient stated she cannot find a pain management doctor. Patient would like to be weaned off the pain medicaiton.     Pill count not performed patient did not bring medications    Pill count verified with patient  Patient reports taking medication    UDS obtained and sent   Pain contract    made available for  Dr. Kathy Rodriguez given for Valium 10 mg, Dilaudid 8 mg, Dilaudid 4 mg

## 2017-08-28 LAB — DRUGS UR: NORMAL

## 2017-09-21 ENCOUNTER — TELEPHONE (OUTPATIENT)
Dept: NEUROLOGY | Age: 52
End: 2017-09-21

## 2017-09-22 ENCOUNTER — TELEPHONE (OUTPATIENT)
Dept: NEUROLOGY | Age: 52
End: 2017-09-22

## 2017-09-22 NOTE — TELEPHONE ENCOUNTER
Patient states she needs to speak with someone about weaning program.  Says she needs to clarify some things prior to her next appointment because she will be out of medication

## 2017-09-25 NOTE — TELEPHONE ENCOUNTER
Patient made aware Dr. Tish Hernandez the weaning protocol will be discussed at her next follow up appointment. Patient verbalized understanding.

## 2017-11-20 ENCOUNTER — OFFICE VISIT (OUTPATIENT)
Dept: NEUROLOGY | Age: 52
End: 2017-11-20

## 2017-11-20 VITALS
WEIGHT: 235.6 LBS | HEART RATE: 99 BPM | HEIGHT: 68 IN | OXYGEN SATURATION: 93 % | BODY MASS INDEX: 35.71 KG/M2 | DIASTOLIC BLOOD PRESSURE: 76 MMHG | SYSTOLIC BLOOD PRESSURE: 136 MMHG | RESPIRATION RATE: 16 BRPM | TEMPERATURE: 98 F

## 2017-11-20 DIAGNOSIS — G62.9 POLYNEUROPATHY: Primary | ICD-10-CM

## 2017-11-20 DIAGNOSIS — G89.4 CHRONIC PAIN SYNDROME: ICD-10-CM

## 2017-11-20 DIAGNOSIS — M79.18 MYOFASCIAL PAIN: ICD-10-CM

## 2017-11-20 DIAGNOSIS — Z76.0 MEDICATION REFILL: ICD-10-CM

## 2017-11-20 DIAGNOSIS — M54.2 CERVICALGIA: ICD-10-CM

## 2017-11-20 DIAGNOSIS — M54.12 CERVICAL RADICULOPATHY: ICD-10-CM

## 2017-11-20 RX ORDER — HYDROMORPHONE HYDROCHLORIDE 4 MG/1
4 TABLET ORAL EVERY 12 HOURS
Qty: 60 TAB | Refills: 0 | Status: SHIPPED | OUTPATIENT
Start: 2017-11-20 | End: 2018-01-18 | Stop reason: SDUPTHER

## 2017-11-20 RX ORDER — DIAZEPAM 10 MG/1
10 TABLET ORAL 2 TIMES DAILY
Qty: 60 TAB | Refills: 3 | Status: SHIPPED | OUTPATIENT
Start: 2017-11-20 | End: 2018-02-03 | Stop reason: SDUPTHER

## 2017-11-20 NOTE — PROGRESS NOTES
Neurology Progress Note    NAME:  Sushma Gabriel   :   1965   MRN:   [id number]     Date/Time:  2017  Subjective:      Sushma Gabriel is a 47 y.o. female here today for follow up for neck pain, migraine headache  Neck pain has been excruciating , sharp in nature , goes down to the arms, says she could not do without the pain medication. She has been scheduled with pain management specialist.She started experiencing ear pain , which she says is achy in nature but was told that she does not have ear infection. Headache frequency is variable,  throbbing in nature associated with dizziness , medication helps at times. Review of Systems - General ROS: positive for  - fatigue and sleep disturbance  Psychological ROS: positive for - anxiety and depression  Ophthalmic ROS: positive for - blurry vision and photophobia  ENT ROS: positive for - headaches, tinnitus, vertigo and visual changes  Allergy and Immunology ROS: negative  Hematological and Lymphatic ROS: negative  Endocrine ROS: negative  Respiratory ROS: no cough, shortness of breath, or wheezing  Cardiovascular ROS: no chest pain or dyspnea on exertion  Gastrointestinal ROS: no abdominal pain, change in bowel habits, or black or bloody stools  Genito-Urinary ROS: no dysuria, trouble voiding, or hematuria  Musculoskeletal ROS: positive for - joint pain, joint stiffness, muscle pain and muscular weakness  Neurological ROS: positive for - dizziness, headaches, impaired coordination/balance, numbness/tingling, visual changes and weakness  Dermatological ROS: negative  Medications reviewed:  Current Outpatient Prescriptions   Medication Sig Dispense Refill    HYDROmorphone (DILAUDID) 4 mg tablet Take 1 Tab by mouth every twelve (12) hours every twelve (12) hours. Max Daily Amount: 8 mg. 40 Tab 0    diazePAM (VALIUM) 10 mg tablet Take 1 Tab by mouth two (2) times a day.  Max Daily Amount: 20 mg. 60 Tab 3    ibuprofen (MOTRIN) 800 mg tablet Take 1 Tab by mouth every eight (8) hours as needed for Pain. 40 Tab 3    VENTOLIN HFA 90 mcg/actuation inhaler INHALE 1 TO 2 PUFFS AS NEEDED EVERY 4 HOURS  2    benzonatate (TESSALON) 100 mg capsule TAKE ONE CAPSULE BY MOUTH 3 TIMES A DAY FOR 10 DAYS  1    ADVANCED GLUC METER TEST STRIP strip USE TO TEST BLOOD SUGAR UP TO TWICE A DAY AS DIRECTED  11    NEXIUM 24HR 20 mg TbEC TAKE 1 TABLET BY MOUTH EVERY DAY  4    BASAGLAR KWIKPEN 100 unit/mL (3 mL) inpn INJECT 30 UNITS UNDER THE SKIN DAILY  5    metFORMIN (GLUCOPHAGE) 500 mg tablet TAKE 1 TABLET BY MOUTH TWICE A DAY WITH MEALS  3    methocarbamol (ROBAXIN) 500 mg tablet TAKE 2 TABLETS 4 TIMES A DAY AS NEEDED FOR MUSCLE SPASM  0    valsartan-hydroCHLOROthiazide (DIOVAN-HCT) 80-12.5 mg per tablet TAKE 1 TABLET BY MOUTH ONCE A DAY 30 DAY(S)  5    lisinopril (PRINIVIL, ZESTRIL) 20 mg tablet Take 20 mg by mouth daily.  albuterol (PROVENTIL, VENTOLIN) 90 mcg/Actuation inhaler Take 2 Puffs by inhalation every six (6) hours as needed.  gabapentin (NEURONTIN) 300 mg capsule TAKE 1 CAPSULE DAILY AS DIRECTED. TAKE 1 AT NIGHT FOR FIVE DAYS THEN 3 TIMES A DAY  3    HYDROcodone-acetaminophen (NORCO)  mg tablet TAKE 1 TABLET BY MOUTH 3 TIMES A DAY AS DIRECTED FOR 30 DAYS  0    methylPREDNISolone (MEDROL DOSEPACK) 4 mg tablet TAKE AS DIRECTED FOR 6 DAYS  0    oxyCODONE-acetaminophen (PERCOCET) 5-325 mg per tablet TK 1   TO  2  TS  PO  Q  4  TO  6  H  PRF  PAIN  0    traMADol (ULTRAM) 50 mg tablet TAKE 1 OR 2 TABLETS EVERY 6 HOURS AS NEEDED FOR PAIN  0    HYDROcodone-acetaminophen (NORCO) 5-325 mg per tablet Take 1 Tab by mouth every four (4) hours as needed for Pain. Max Daily Amount: 6 Tabs. 12 Tab 0    cyclobenzaprine (FLEXERIL) 10 mg tablet Take 1 Tab by mouth three (3) times daily as needed for Muscle Spasm(s). 20 Tab 0    hydrochlorothiazide (HYDRODIURIL) 25 mg tablet Take 25 mg by mouth daily.           Objective:   Vitals:  Vitals:    11/20/17 1026   BP: 136/76 Pulse: 99   Resp: 16   Temp: 98 °F (36.7 °C)   TempSrc: Temporal   SpO2: 93%   Weight: 235 lb 9.6 oz (106.9 kg)   Height: 5' 8\" (1.727 m)   PainSc:   8   PainLoc: Generalized               Lab Data Reviewed:  Lab Results   Component Value Date/Time    WBC 9.6 08/03/2010 04:10 PM    HCT 30.1 08/03/2010 04:10 PM    HGB 10.1 08/03/2010 04:10 PM    PLATELET 814 80/66/6806 04:10 PM       Lab Results   Component Value Date/Time    Sodium 134 08/03/2010 04:10 PM    Potassium 4.4 08/03/2010 04:10 PM    Chloride 98 08/03/2010 04:10 PM    CO2 33 08/03/2010 04:10 PM    Glucose 97 08/03/2010 04:10 PM    BUN 10 08/03/2010 04:10 PM    Creatinine 0.8 08/03/2010 04:10 PM    Calcium 7.9 08/03/2010 04:10 PM       No components found for: TROPQUANT    No results found for: GARY      Lab Results   Component Value Date/Time    Hemoglobin A1c 5.8 07/22/2010 12:16 PM        No results found for: B12LT, FOL, RBCF    No results found for: GARY, ANARX, ANAIGG, XBANA    No results found for: CHOL, CHOLPOCT, CHOLX, CHLST, CHOLV, HDL, HDLPOC, LDL, LDLCPOC, LDLC, DLDLP, VLDLC, VLDL, TGLX, TRIGL, TRIGP, TGLPOCT, CHHD, CHHDX      CT Results (recent):    Results from Hospital Encounter encounter on 02/13/15   CT ABD PELV W CONT   Narrative **Final Report**      ICD Codes / Adm. Diagnosis: 789.00  070.54 / Abdominal pain, unspecified si    Chronic hepatitis C without   Examination:  CT ABD PELVIS W CON  - [id number] - Feb 13 2015  1:29PM  Accession No:  [id number]  Reason:  Hepatitis C, chronic, Abdominal pain, Constipated      REPORT:  EXAM: CT ABDOMEN PELVIS WITH CONTRAST    INDICATION:  Abdominal pain with chronic hepatitis C.    COMPARISON: None. CONTRAST: 97 mL of Isovue-370. TECHNIQUE:   Multislice helical CT was performed from the diaphragm to the symphysis   pubis during uneventful rapid bolus intravenous contrast administration. Oral contrast was also administered .        Contiguous 5 mm axial images   were reconstructed and lung and soft tissue windows were generated. Coronal   and sagittal reformations were generated. CT dose reduction was achieved   through use of a standardized protocol tailored for this examination and   automatic exposure control for dose modulation. FINDINGS:  The patient is obese. LOWER CHEST: The visualized portions of the lung bases are clear. ABDOMEN:  Liver: The liver is normal in size and contour with no focal abnormality. The attenuation of the liver is diffusely decreased throughout. Gallbladder and bile ducts: There is no calcified gallstone or biliary   dilatation. Spleen: No abnormality. Pancreas: No abnormality. Adrenal glands: No abnormality. Kidneys: No abnormality. PELVIS:  Reproductive organs: The uterus is absent. Bladder: No abnormality. BOWEL AND MESENTERY: The small bowel is normal.  There is no mesenteric mass   or adenopathy. The appendix    . PERITONEUM: There is no ascites or free intraperitoneal air. RETROPERITONEUM: The aorta  tapers without aneurysm. There are numerous   small lymph nodes throughout the retroperitoneum. There is no   retroperitoneal adenopathy or mass. There is no pelvic mass or adenopathy. BONES AND SOFT TISSUES: The patient is status post L4-L5 fusion with an   interbody bone graft and rods and pedicle screws. There are degenerative   disc changes at L2-L3 and L5-S1. IMPRESSION:   1. No acute abdominal or pelvic abnormality identified. 2. Obesity with hepatic steatosis. No focal hepatic abnormality. 3. Status post lumbar spine fusion at L4-L5 with lumbar spondylosis.            Signing/Reading Doctor: Santiago Hogan ([id number])    Approved: Santiago Hogan ([id number])  Feb 13 2015  1:38PM                                   MRI Results (recent):    Results from Hospital Encounter encounter on 02/08/17   MRI LUMB SPINE WO CONT   Narrative Indication: Lumbar radiculitis after previous back surgery    Exam: MRI of the lumbar spine. Sequences include sagittal and axial T1 and  T2-weighted images. Sagittal STIR. Comparisons: July 8, 2011 and Cara 10, 2011    Contrast: None. Findings: There is slight retrolisthesis of L3 on L4. Patient is post L4-L5  interbody fusion and posterior decompression. There is susceptibility artifact  dorsal to the L3-L4 disc space. There is multilevel endplate degenerative  change. Cord terminates normally posterior to L1. Paraspinous soft tissues are  within normal limits. T12-L1: No stenosis    L1-L2: No stenosis    L2-L3: There is disc height loss and degeneration of this disc. There is a  diffuse disc bulge with slight narrowing of the canal. No foraminal narrowing    L3-L4: There is disc height loss with a diffuse disc bulge. There are bilateral  facet degenerative changes with thickening of the ligamentum flavum canal  stenosis is moderate which has progressed in the interval with narrowing of the  bilateral subarticular zones. There is moderate narrowing of the bilateral  foramen    L4-L5: This level has been decompressed without residual canal or foraminal  narrowing    L5-S1: There are bilateral facet degenerative changes with disc height loss and  a small disc osteophytic bulge. There is mild left greater than right foraminal  narrowing and mild narrowing of the canal, unchanged         Impression Impression:  1. Aggressive degeneration of the L3-L4 disc level now with moderate narrowing  of the canal and bilateral foramen  2. Postoperative changes L4-L5  3. Mild degenerative changes L2-3 and L5-S1 unchanged          IR Results (recent):  No results found for this or any previous visit. VAS/US Results (recent):  No results found for this or any previous visit. PHYSICAL EXAM:  General:    Alert, cooperative, no distress, appears stated age. Head:   Normocephalic, without obvious abnormality, atraumatic. Eyes:   Conjunctivae/corneas clear. PERRLA  Nose:  Nares normal. No drainage or sinus tenderness. Throat:    Lips, mucosa, and tongue normal.  No Thrush  Neck:  Supple, symmetrical,  no adenopathy, thyroid: non tender    no carotid bruit and no JVD. Paraspinal tenderness  Back:    Symmetric,  No CVA tenderness. Lungs:   Clear to auscultation bilaterally. No Wheezing or Rhonchi. No rales. Chest wall:  No tenderness or deformity. No Accessory muscle use. Heart:   Regular rate and rhythm,  no murmur, rub or gallop. Abdomen:   Soft, non-tender. Not distended. Bowel sounds normal. No masses  Extremities: Extremities normal, atraumatic, No cyanosis. No edema. No clubbing  Skin:     Texture, turgor normal. No rashes or lesions. Not Jaundiced  Lymph nodes: Cervical, supraclavicular normal.  Psych:  Good insight. Not depressed. Not anxious or agitated. NEUROLOGICAL EXAM:  Appearance: The patient is well developed, well nourished, provides a coherent history and is in no acute distress. Mental Status: Oriented to time, place and person. Mood and affect appropriate. Cranial Nerves:   Intact visual fields. Fundi are benign. SHERIF, EOM's full, no nystagmus, no ptosis. Facial sensation is normal. Corneal reflexes are intact. Facial movement is symmetric. Hearing is normal bilaterally. Palate is midline with normal sternocleidomastoid and trapezius muscles are normal. Tongue is midline. Motor:  5/5 strength in upper and lower proximal and distal muscles. Normal bulk and tone. No fasciculations. Reflexes:   Deep tendon reflexes 2+/4 and symmetrical.   Sensory:   Decreased sensation to touch, pinprick and vibration. Gait:  Normal gait. Tremor:   No tremor noted. Cerebellar:  No cerebellar signs present. Neurovascular:  Normal heart sounds and regular rhythm, peripheral pulses intact, and no carotid bruits. Assesment  1.  Chronic pain syndrome    - COMPLIANCE DRUG SCREEN/PRESCRIPTION MONITORING  - HYDROmorphone (DILAUDID) 4 mg tablet; Take 1 Tab by mouth every twelve (12) hours every twelve (12) hours. Max Daily Amount: 8 mg. Dispense: 40 Tab; Refill: 0    2. Medication refill    - diazePAM (VALIUM) 10 mg tablet; Take 1 Tab by mouth two (2) times a day. Max Daily Amount: 20 mg. Dispense: 60 Tab; Refill: 3    3. Polyneuropathy    - COMPLIANCE DRUG SCREEN/PRESCRIPTION MONITORING    4. Myofascial pain    - COMPLIANCE DRUG SCREEN/PRESCRIPTION MONITORING    5. Cervical radiculopathy    - COMPLIANCE DRUG SCREEN/PRESCRIPTION MONITORING    6. Cervicalgia      ___________________________________________________  PLAN:Medication reviewed with patient      ICD-10-CM ICD-9-CM    1. Polyneuropathy G62.9 356.9 COMPLIANCE DRUG SCREEN/PRESCRIPTION MONITORING   2. Chronic pain syndrome G89.4 338.4 COMPLIANCE DRUG SCREEN/PRESCRIPTION MONITORING      HYDROmorphone (DILAUDID) 4 mg tablet   3. Medication refill Z76.0 V68.1 diazePAM (VALIUM) 10 mg tablet   4. Myofascial pain M79.1 729.1 COMPLIANCE DRUG SCREEN/PRESCRIPTION MONITORING   5. Cervical radiculopathy M54.12 723.4 COMPLIANCE DRUG SCREEN/PRESCRIPTION MONITORING   6. Cervicalgia M54.2 723.1      Follow-up Disposition:  Return in about 3 months (around 2/20/2018).            ___________________________________________________    Total time spent with patient:  []15   []25   []35   [] __ minutes    Care Plan discussed with:    [x]Patient   []Family    []Care Manager   []Consultant/Specialist :    ___________________________________________________    Attending Physician: Parag Remy MD

## 2017-11-20 NOTE — PROGRESS NOTES
Chief Complaint   Patient presents with    Other     Polyneuropathy    Pain (Chronic)    Medication Refill     1. Have you been to the ER, urgent care clinic since your last visit? Hospitalized since your last visit? no    2. Have you seen or consulted any other health care providers outside of the [address] [place] [address] since your last visit? Include any pap smears or colon screening.  Dr. Dragan Maria count not performed due to patient did not bring medications     Pill count not verified with patient  Patient reports taking medication    UDS obtained and sent   Pain contract on file   made available            Script given for Dilaudid and Valium    Patient will see Dr. Monster Scott on 12/22/17

## 2017-11-20 NOTE — MR AVS SNAPSHOT
Visit Information Date & Time Provider Department Dept. Phone Encounter #  
 11/20/2017 10:40 AM Dylan Romero MD [facility] [phone] [id number] Follow-up Instructions Return in about 3 months (around 2/20/2018). Upcoming Health Maintenance Date Due Hepatitis C Screening 1965 Pneumococcal 19-64 Medium Risk (1 of 1 - PPSV23) 2/10/1983 DTaP/Tdap/Td series (1 - Tdap) 2/10/1985 PAP AKA CERVICAL CYTOLOGY 2/10/1985 BREAST CANCER SCRN MAMMOGRAM 2/10/2014 FOBT Q 1 YEAR AGE 50-75 2/10/2014 Influenza Age 5 to Adult 8/1/2017 Allergies as of 11/20/2017  Review Complete On: 11/20/2017 By: Radha Sheridan MD  
  
 Severity Noted Reaction Type Reactions Chantix [Varenicline]  10/19/2010    Itching  
 Gabapentin  10/19/2010    Swelling Motrin [Ibuprofen]  10/19/2010    Swelling Current Immunizations  Never Reviewed No immunizations on file. Not reviewed this visit You Were Diagnosed With   
  
 Codes Comments Polyneuropathy    -  Primary ICD-10-CM: G62.9 ICD-9-CM: 356.9 Chronic pain syndrome     ICD-10-CM: G89.4 ICD-9-CM: 338.4 Medication refill     ICD-10-CM: Z76.0 ICD-9-CM: V68.1 Myofascial pain     ICD-10-CM: M79.1 ICD-9-CM: 729.1 Cervical radiculopathy     ICD-10-CM: M54.12 
ICD-9-CM: 723.4 Cervicalgia     ICD-10-CM: M54.2 ICD-9-CM: 723.1 Vitals BP Pulse Temp Resp Height(growth percentile) Weight(growth percentile) 136/76 (BP 1 Location: Left arm, BP Patient Position: Sitting) 99 98 °F (36.7 °C) (Temporal) 16 5' 8\" (1.727 m) 235 lb 9.6 oz (106.9 kg) SpO2 BMI OB Status Smoking Status 93% 35.82 kg/m2 Hysterectomy Current Some Day Smoker BMI and BSA Data Body Mass Index Body Surface Area  
 35.82 kg/m 2 2.26 m 2 Preferred Pharmacy Pharmacy Name Phone [facility] [address] [facility] [address] [phone] Your Updated Medication List  
  
   
This list is accurate as of: 11/20/17 11:06 AM.  Always use your most recent med list.  
  
  
  
  
 ADVANCED GLUC METER TEST STRIP strip Generic drug:  glucose blood VI test strips USE TO TEST BLOOD SUGAR UP TO TWICE A DAY AS DIRECTED  
  
 albuterol 90 mcg/actuation inhaler Commonly known as:  Lavakshat Cornejo Take 2 Puffs by inhalation every six (6) hours as needed. BASAGLAR KWIKPEN 100 unit/mL (3 mL) Inpn Generic drug:  insulin glargine INJECT 30 UNITS UNDER THE SKIN DAILY  
  
 benzonatate 100 mg capsule Commonly known as:  TESSALON  
TAKE ONE CAPSULE BY MOUTH 3 TIMES A DAY FOR 10 DAYS  
  
 cyclobenzaprine 10 mg tablet Commonly known as:  FLEXERIL Take 1 Tab by mouth three (3) times daily as needed for Muscle Spasm(s). diazePAM 10 mg tablet Commonly known as:  VALIUM Take 1 Tab by mouth two (2) times a day. Max Daily Amount: 20 mg.  
  
 gabapentin 300 mg capsule Commonly known as:  NEURONTIN  
TAKE 1 CAPSULE DAILY AS DIRECTED. TAKE 1 AT NIGHT FOR FIVE DAYS THEN 3 TIMES A DAY  
  
 hydroCHLOROthiazide 25 mg tablet Commonly known as:  HYDRODIURIL Take 25 mg by mouth daily. * HYDROcodone-acetaminophen 5-325 mg per tablet Commonly known as:  Tierney Jairo Take 1 Tab by mouth every four (4) hours as needed for Pain. Max Daily Amount: 6 Tabs. * HYDROcodone-acetaminophen  mg tablet Commonly known as:  NORCO  
TAKE 1 TABLET BY MOUTH 3 TIMES A DAY AS DIRECTED FOR 30 DAYS HYDROmorphone 4 mg tablet Commonly known as:  DILAUDID Take 1 Tab by mouth every twelve (12) hours every twelve (12) hours. Max Daily Amount: 8 mg. ibuprofen 800 mg tablet Commonly known as:  MOTRIN Take 1 Tab by mouth every eight (8) hours as needed for Pain. lisinopril 20 mg tablet Commonly known as:  Jacktamia Choudhury Take 20 mg by mouth daily. metFORMIN 500 mg tablet Commonly known as:  GLUCOPHAGE  
TAKE 1 TABLET BY MOUTH TWICE A DAY WITH MEALS  
  
 methocarbamol 500 mg tablet Commonly known as:  ROBAXIN  
TAKE 2 TABLETS 4 TIMES A DAY AS NEEDED FOR MUSCLE SPASM  
  
 methylPREDNISolone 4 mg tablet Commonly known as:  MEDROL DOSEPACK  
TAKE AS DIRECTED FOR 6 DAYS NexIUM 24HR 20 mg Tbec Generic drug:  esomeprazole magnesium TAKE 1 TABLET BY MOUTH EVERY DAY  
  
 oxyCODONE-acetaminophen 5-325 mg per tablet Commonly known as:  PERCOCET TK 1   TO  2  TS  PO  Q  4  TO  6  H  PRF  PAIN  
  
 traMADol 50 mg tablet Commonly known as:  ULTRAM  
TAKE 1 OR 2 TABLETS EVERY 6 HOURS AS NEEDED FOR PAIN  
  
 valsartan-hydroCHLOROthiazide 80-12.5 mg per tablet Commonly known as:  DIOVAN-HCT  
TAKE 1 TABLET BY MOUTH ONCE A DAY 30 DAY(S) VENTOLIN HFA 90 mcg/actuation inhaler Generic drug:  albuterol INHALE 1 TO 2 PUFFS AS NEEDED EVERY 4 HOURS * Notice: This list has 2 medication(s) that are the same as other medications prescribed for you. Read the directions carefully, and ask your doctor or other care provider to review them with you. Prescriptions Printed Refills HYDROmorphone (DILAUDID) 4 mg tablet 0 Sig: Take 1 Tab by mouth every twelve (12) hours every twelve (12) hours. Max Daily Amount: 8 mg. Class: Print Route: Oral  
 diazePAM (VALIUM) 10 mg tablet 3 Sig: Take 1 Tab by mouth two (2) times a day. Max Daily Amount: 20 mg.  
 Class: Print Route: Oral  
  
We Performed the Following [address] MONITORING [[id number] Custom] Follow-up Instructions Return in about 3 months (around 2/20/2018). Introducing [facility] [place] [facility]!    
 Esvin Aden introduces [employer] patient portal. Now you can access parts of your medical record, email your doctor's office, and request medication refills online. 1. In your internet browser, go to [email or web address] 2. Click on the First Time User? Click Here link in the Sign In box. You will see the New Member Sign Up page. 3. Enter your [employer] Access Code exactly as it appears below. You will not need to use this code after youve completed the sign-up process. If you do not sign up before the expiration date, you must request a new code. · [employer] Access Code: [email or web address] Expires: 11/30/2017 12:40 PM 
 
4. Enter the last four digits of your Social Security Number (xxxx) and Date of Birth (mm/dd/yyyy) as indicated and click Submit. You will be taken to the next sign-up page. 5. Create a [employer] ID. This will be your [employer] login ID and cannot be changed, so think of one that is secure and easy to remember. 6. Create a [employer] password. You can change your password at any time. 7. Enter your Password Reset Question and Answer. This can be used at a later time if you forget your password. 8. Enter your e-mail address. You will receive e-mail notification when new information is available in [address] 9. Click Sign Up. You can now view and download portions of your medical record. 10. Click the Download Summary menu link to download a portable copy of your medical information. If you have questions, please visit the Frequently Asked Questions section of the [employer] website. Remember, [employer] is NOT to be used for urgent needs. For medical emergencies, dial 911. Now available from your iPhone and Android! Please provide this summary of care documentation to your next provider. Your primary care clinician is listed as [facility]. If you have any questions after today's visit, please call [phone].

## 2017-11-28 LAB — DRUGS UR: NORMAL

## 2018-01-15 DIAGNOSIS — Z76.0 MEDICATION REFILL: ICD-10-CM

## 2018-01-15 DIAGNOSIS — G89.4 CHRONIC PAIN SYNDROME: ICD-10-CM

## 2018-01-15 NOTE — TELEPHONE ENCOUNTER
Patient states it is emergency. She went to see pain specialist and when she got there she was told that she was not sure why appointment was scheduled for her as he was not taking any new patients (makes no sense since they scheduled her)    Patient concerned because she on;y has 2 pain pills left and states she cannot detox off the Dilaudid on her on. Patient will like a call from nurse.

## 2018-01-16 NOTE — TELEPHONE ENCOUNTER
Returned patient's call, ID verifed times 2. Patient stated she went to Dr. Ludwig Evans for pain management. She was told he was not seeing any new patients. Patient stated she call all the providers listed on the pain medication provider list. Patient stated she has 3-4 pills left. Patient made aware she would have to be weaned off the medication.  Please advise

## 2018-01-18 RX ORDER — HYDROMORPHONE HYDROCHLORIDE 4 MG/1
4 TABLET ORAL EVERY 12 HOURS
Qty: 40 TAB | Refills: 0 | Status: SHIPPED | OUTPATIENT
Start: 2018-01-18 | End: 2018-02-20 | Stop reason: SDUPTHER

## 2018-01-18 NOTE — TELEPHONE ENCOUNTER
Patient called back regarding the status of refills for pain medication. Patient made aware per Dr. Alex Euceda she can be weaned off. Patient agreed to be weaned off.

## 2018-02-03 DIAGNOSIS — Z76.0 MEDICATION REFILL: ICD-10-CM

## 2018-02-03 RX ORDER — DIAZEPAM 10 MG/1
TABLET ORAL
Qty: 60 TAB | Refills: 1 | Status: SHIPPED | OUTPATIENT
Start: 2018-02-03 | End: 2018-02-07 | Stop reason: SDUPTHER

## 2018-02-07 DIAGNOSIS — Z76.0 MEDICATION REFILL: ICD-10-CM

## 2018-02-08 RX ORDER — DIAZEPAM 10 MG/1
TABLET ORAL
Qty: 60 TAB | Refills: 1 | Status: SHIPPED | OUTPATIENT
Start: 2018-02-08 | End: 2018-02-20 | Stop reason: SDUPTHER

## 2018-02-20 ENCOUNTER — OFFICE VISIT (OUTPATIENT)
Dept: NEUROLOGY | Age: 53
End: 2018-02-20

## 2018-02-20 VITALS
RESPIRATION RATE: 18 BRPM | DIASTOLIC BLOOD PRESSURE: 108 MMHG | OXYGEN SATURATION: 95 % | HEART RATE: 89 BPM | SYSTOLIC BLOOD PRESSURE: 160 MMHG | TEMPERATURE: 97.6 F | HEIGHT: 68 IN | WEIGHT: 229.6 LBS | BODY MASS INDEX: 34.8 KG/M2

## 2018-02-20 DIAGNOSIS — G62.9 POLYNEUROPATHY: Primary | ICD-10-CM

## 2018-02-20 DIAGNOSIS — M79.18 MYOFASCIAL MUSCLE PAIN: ICD-10-CM

## 2018-02-20 DIAGNOSIS — M54.2 CERVICALGIA: ICD-10-CM

## 2018-02-20 DIAGNOSIS — G89.4 CHRONIC PAIN SYNDROME: ICD-10-CM

## 2018-02-20 DIAGNOSIS — Z76.0 MEDICATION REFILL: ICD-10-CM

## 2018-02-20 DIAGNOSIS — Z79.891 USE OF OPIATES FOR THERAPEUTIC PURPOSES: ICD-10-CM

## 2018-02-20 RX ORDER — HYDROMORPHONE HYDROCHLORIDE 4 MG/1
4 TABLET ORAL EVERY 12 HOURS
Qty: 40 TAB | Refills: 0 | Status: SHIPPED | OUTPATIENT
Start: 2018-02-20

## 2018-02-20 RX ORDER — DIAZEPAM 10 MG/1
10 TABLET ORAL 2 TIMES DAILY
Qty: 60 TAB | Refills: 3 | Status: SHIPPED | OUTPATIENT
Start: 2018-02-20 | End: 2018-08-16 | Stop reason: SDUPTHER

## 2018-02-20 NOTE — MR AVS SNAPSHOT
[address] [facility] 
 
 
 [place] [address] [place] [address] 
[phone] Patient: Analilia Hartley MRN: [id number] :1965 Visit Information Date & Time Provider Department Dept. Phone Encounter #  
 2018 10:40 AM Dylan Trejo MD [facility] at [facility] [phone] [id number] Follow-up Instructions Return in about 2 months (around 2018). Your Appointments 2018 10:40 AM  
Follow Up with Dylan Trejo MD  
[facility] at [facility]) Appt Note: fuv  
 [address] [facility], [place], operated by [facility] Upcoming Health Maintenance Date Due Hepatitis C Screening 1965 Pneumococcal 19-64 Medium Risk (1 of 1 - PPSV23) 2/10/1983 DTaP/Tdap/Td series (1 - Tdap) 2/10/1985 PAP AKA CERVICAL CYTOLOGY 2/10/1985 BREAST CANCER SCRN MAMMOGRAM 2/10/2014 FOBT Q 1 YEAR AGE 50-75 2/10/2014 Influenza Age 5 to Adult 2017 Allergies as of 2018  Review Complete On: 2018 By: Flex Whitmore MD  
  
 Severity Noted Reaction Type Reactions Chantix [Varenicline]  10/19/2010    Itching  
 Gabapentin  10/19/2010    Swelling Motrin [Ibuprofen]  10/19/2010    Swelling Current Immunizations  Never Reviewed No immunizations on file. Not reviewed this visit You Were Diagnosed With   
  
 Codes Comments Polyneuropathy    -  Primary ICD-10-CM: G62.9 ICD-9-CM: 356.9 Chronic pain syndrome     ICD-10-CM: G89.4 ICD-9-CM: 338.4 Medication refill     ICD-10-CM: Z76.0 ICD-9-CM: V68.1 Use of opiates for therapeutic purposes     ICD-10-CM: Z79.891 ICD-9-CM: V58.69 Cervicalgia     ICD-10-CM: M54.2 ICD-9-CM: 723.1 Myofascial muscle pain     ICD-10-CM: M79.1 ICD-9-CM: 729.1 Vitals BP Pulse Temp Resp Height(growth percentile) Weight(growth percentile) (!) 160/108 (BP 1 Location: Left arm, BP Patient Position: Sitting) 89 97.6 °F (36.4 °C) (Oral) 18 5' 8\" (1.727 m) 229 lb 9.6 oz (104.1 kg) SpO2 BMI OB Status Smoking Status 95% 34.91 kg/m2 Hysterectomy Current Some Day Smoker BMI and BSA Data Body Mass Index Body Surface Area 34.91 kg/m 2 2.23 m 2 Preferred Pharmacy Pharmacy Name Phone [id number]- [place], [address] [place] [address] [phone] Your Updated Medication List  
  
   
This list is accurate as of: 2/20/18 10:22 AM.  Always use your most recent med list.  
  
  
  
  
 ADVANCED GLUC METER TEST STRIP strip Generic drug:  glucose blood VI test strips USE TO TEST BLOOD SUGAR UP TO TWICE A DAY AS DIRECTED  
  
 albuterol 90 mcg/actuation inhaler Commonly known as:  Helen Bump Take 2 Puffs by inhalation every six (6) hours as needed. BASAGLAR KWIKPEN U-100 INSULIN 100 unit/mL (3 mL) Inpn Generic drug:  insulin glargine INJECT 30 UNITS UNDER THE SKIN DAILY  
  
 benzonatate 100 mg capsule Commonly known as:  TESSALON  
TAKE ONE CAPSULE BY MOUTH 3 TIMES A DAY FOR 10 DAYS  
  
 cyclobenzaprine 10 mg tablet Commonly known as:  FLEXERIL Take 1 Tab by mouth three (3) times daily as needed for Muscle Spasm(s). diazePAM 10 mg tablet Commonly known as:  VALIUM Take 1 Tab by mouth two (2) times a day. Max Daily Amount: 20 mg.  
  
 gabapentin 300 mg capsule Commonly known as:  NEURONTIN  
TAKE 1 CAPSULE DAILY AS DIRECTED. TAKE 1 AT NIGHT FOR FIVE DAYS THEN 3 TIMES A DAY  
  
 hydroCHLOROthiazide 25 mg tablet Commonly known as:  HYDRODIURIL Take 25 mg by mouth daily. * HYDROcodone-acetaminophen 5-325 mg per tablet Commonly known as:  [address] Take 1 Tab by mouth every four (4) hours as needed for Pain. Max Daily Amount: 6 Tabs. * HYDROcodone-acetaminophen  mg tablet Commonly known as:  NORCO  
TAKE 1 TABLET BY MOUTH 3 TIMES A DAY AS DIRECTED FOR 30 DAYS HYDROmorphone 4 mg tablet Commonly known as:  DILAUDID Take 1 Tab by mouth every twelve (12) hours every twelve (12) hours. Max Daily Amount: 8 mg. ibuprofen 800 mg tablet Commonly known as:  MOTRIN Take 1 Tab by mouth every eight (8) hours as needed for Pain. lisinopril 20 mg tablet Commonly known as:  Stiven Converse Take 20 mg by mouth daily. metFORMIN 500 mg tablet Commonly known as:  GLUCOPHAGE  
TAKE 1 TABLET BY MOUTH TWICE A DAY WITH MEALS  
  
 methocarbamol 500 mg tablet Commonly known as:  ROBAXIN  
TAKE 2 TABLETS 4 TIMES A DAY AS NEEDED FOR MUSCLE SPASM  
  
 methylPREDNISolone 4 mg tablet Commonly known as:  MEDROL DOSEPACK  
TAKE AS DIRECTED FOR 6 DAYS NexIUM 24HR 20 mg Tbec Generic drug:  esomeprazole magnesium TAKE 1 TABLET BY MOUTH EVERY DAY  
  
 oxyCODONE-acetaminophen 5-325 mg per tablet Commonly known as:  PERCOCET TK 1   TO  2  TS  PO  Q  4  TO  6  H  PRF  PAIN  
  
 traMADol 50 mg tablet Commonly known as:  ULTRAM  
TAKE 1 OR 2 TABLETS EVERY 6 HOURS AS NEEDED FOR PAIN  
  
 valsartan-hydroCHLOROthiazide 80-12.5 mg per tablet Commonly known as:  DIOVAN-HCT  
TAKE 1 TABLET BY MOUTH ONCE A DAY 30 DAY(S) VENTOLIN HFA 90 mcg/actuation inhaler Generic drug:  albuterol INHALE 1 TO 2 PUFFS AS NEEDED EVERY 4 HOURS * Notice: This list has 2 medication(s) that are the same as other medications prescribed for you. Read the directions carefully, and ask your doctor or other care provider to review them with you. Prescriptions Printed Refills HYDROmorphone (DILAUDID) 4 mg tablet 0 Sig: Take 1 Tab by mouth every twelve (12) hours every twelve (12) hours. Max Daily Amount: 8 mg. Class: Print  Route: Oral  
 diazePAM (VALIUM) 10 mg tablet 3  
 Sig: Take 1 Tab by mouth two (2) times a day. Max Daily Amount: 20 mg.  
 Class: Print Route: Oral  
  
We Performed the Following [address] MONITORING [[id number] Custom] Follow-up Instructions Return in about 2 months (around 4/20/2018). Introducing [facility] SERVICES! Katey Nichols introduces [employer] patient portal. Now you can access parts of your medical record, email your doctor's office, and request medication refills online. 1. In your internet browser, go to [email or web address] 2. Click on the First Time User? Click Here link in the Sign In box. You will see the New Member Sign Up page. 3. Enter your [employer] Access Code exactly as it appears below. You will not need to use this code after youve completed the sign-up process. If you do not sign up before the expiration date, you must request a new code. · [employer] Access Code: [email or web address] Expires: 5/21/2018 10:02 AM 
 
4. Enter the last four digits of your Social Security Number (xxxx) and Date of Birth (mm/dd/yyyy) as indicated and click Submit. You will be taken to the next sign-up page. 5. Create a [employer] ID. This will be your [employer] login ID and cannot be changed, so think of one that is secure and easy to remember. 6. Create a [employer] password. You can change your password at any time. 7. Enter your Password Reset Question and Answer. This can be used at a later time if you forget your password. 8. Enter your e-mail address. You will receive e-mail notification when new information is available in [address] 9. Click Sign Up. You can now view and download portions of your medical record. 10. Click the Download Summary menu link to download a portable copy of your medical information. If you have questions, please visit the Frequently Asked Questions section of the [employer] website.  Remember, [employer] is NOT to be used for urgent needs. For medical emergencies, dial 911. Now available from your iPhone and Android! Please provide this summary of care documentation to your next provider. Your primary care clinician is listed as [facility]. If you have any questions after today's visit, please call [phone].

## 2018-02-20 NOTE — PROGRESS NOTES
Chief Complaint   Patient presents with    Migraine    Medication Refill     1. Have you been to the ER, urgent care clinic since your last visit? Hospitalized since your last visit? no    2. Have you seen or consulted any other health care providers outside of the [address] [place] [address] since your last visit? Include any pap smears or colon screening. Dr. Charu Coleman    Patient stated she has not found a pain management provider. Patient stated she prefer to be weaned off    Pill count not performed patient did not bring medications     Pill count not verified with patient  Patient reports taking medication     UDS obtained and sent   Pain contract on file   made available for Dr. Ajay Rain given for . Dilaudid, and Valium

## 2018-02-20 NOTE — PROGRESS NOTES
Neurology Progress Note    NAME:  Maritza Stewart   :   1965   MRN:   [id number]     Date/Time:  2018  Subjective:      Maritza Stewart is a 46 y.o. female here today for follow up for headache, neck pain. Neck pain is persistent, sharp in nature, made worse by activity, pain goes down the arms causing a lot of numbness and tingling sensation, goes up to the neck,  making headache worse. Headache is throbbing in nature frequent, frontal  Associated with dizziness blurry vision, medication helps some, neck pain makes it worse. She reports low back pain, sharp in nature, goes down to the legs, activity makes it worse. Patient says she is in constant pain, she has been referred to the pain management but she is yet to get in. Patient was given the option of weaning off narcotics which she is considering as she could not get into pain management.   Review of Systems - General ROS: positive for  - fatigue, night sweats and sleep disturbance  Psychological ROS: positive for - anxiety, concentration difficulties, depression, mood swings, obsessive thoughts and sleep disturbances  Ophthalmic ROS: positive for - blurry vision  ENT ROS: positive for - headaches, vertigo and visual changes  Allergy and Immunology ROS: negative  Hematological and Lymphatic ROS: negative  Endocrine ROS: negative  Respiratory ROS: no cough, shortness of breath, or wheezing  Cardiovascular ROS: no chest pain or dyspnea on exertion  Gastrointestinal ROS: no abdominal pain, change in bowel habits, or black or bloody stools  Genito-Urinary ROS: no dysuria, trouble voiding, or hematuria  Musculoskeletal ROS: positive for - joint pain, joint stiffness, joint swelling, muscle pain and muscular weakness  Neurological ROS: positive for - dizziness, gait disturbance, headaches, impaired coordination/balance, numbness/tingling, visual changes and weakness  Dermatological ROS: negative    Medications reviewed:  Current Outpatient Prescriptions Medication Sig Dispense Refill    diazePAM (VALIUM) 10 mg tablet TAKE 1 TABLET BY MOUTH TWICE A DAY 60 Tab 1    HYDROmorphone (DILAUDID) 4 mg tablet Take 1 Tab by mouth every twelve (12) hours every twelve (12) hours. Max Daily Amount: 8 mg. 40 Tab 0    ibuprofen (MOTRIN) 800 mg tablet Take 1 Tab by mouth every eight (8) hours as needed for Pain. 40 Tab 3    VENTOLIN HFA 90 mcg/actuation inhaler INHALE 1 TO 2 PUFFS AS NEEDED EVERY 4 HOURS  2    benzonatate (TESSALON) 100 mg capsule TAKE ONE CAPSULE BY MOUTH 3 TIMES A DAY FOR 10 DAYS  1    ADVANCED GLUC METER TEST STRIP strip USE TO TEST BLOOD SUGAR UP TO TWICE A DAY AS DIRECTED  11    NEXIUM 24HR 20 mg TbEC TAKE 1 TABLET BY MOUTH EVERY DAY  4    BASAGLAR KWIKPEN 100 unit/mL (3 mL) inpn INJECT 30 UNITS UNDER THE SKIN DAILY  5    metFORMIN (GLUCOPHAGE) 500 mg tablet TAKE 1 TABLET BY MOUTH TWICE A DAY WITH MEALS  3    methocarbamol (ROBAXIN) 500 mg tablet TAKE 2 TABLETS 4 TIMES A DAY AS NEEDED FOR MUSCLE SPASM  0    valsartan-hydroCHLOROthiazide (DIOVAN-HCT) 80-12.5 mg per tablet TAKE 1 TABLET BY MOUTH ONCE A DAY 30 DAY(S)  5    cyclobenzaprine (FLEXERIL) 10 mg tablet Take 1 Tab by mouth three (3) times daily as needed for Muscle Spasm(s). 20 Tab 0    lisinopril (PRINIVIL, ZESTRIL) 20 mg tablet Take 20 mg by mouth daily.  albuterol (PROVENTIL, VENTOLIN) 90 mcg/Actuation inhaler Take 2 Puffs by inhalation every six (6) hours as needed.  gabapentin (NEURONTIN) 300 mg capsule TAKE 1 CAPSULE DAILY AS DIRECTED.  TAKE 1 AT NIGHT FOR FIVE DAYS THEN 3 TIMES A DAY  3    HYDROcodone-acetaminophen (NORCO)  mg tablet TAKE 1 TABLET BY MOUTH 3 TIMES A DAY AS DIRECTED FOR 30 DAYS  0    methylPREDNISolone (MEDROL DOSEPACK) 4 mg tablet TAKE AS DIRECTED FOR 6 DAYS  0    oxyCODONE-acetaminophen (PERCOCET) 5-325 mg per tablet TK 1   TO  2  TS  PO  Q  4  TO  6  H  PRF  PAIN  0    traMADol (ULTRAM) 50 mg tablet TAKE 1 OR 2 TABLETS EVERY 6 HOURS AS NEEDED FOR PAIN  0    HYDROcodone-acetaminophen (NORCO) 5-325 mg per tablet Take 1 Tab by mouth every four (4) hours as needed for Pain. Max Daily Amount: 6 Tabs. 12 Tab 0    hydrochlorothiazide (HYDRODIURIL) 25 mg tablet Take 25 mg by mouth daily. Objective:   Vitals:  Vitals:    02/20/18 1003   BP: (!) 160/108   Pulse: 89   Resp: 18   Temp: 97.6 °F (36.4 °C)   TempSrc: Oral   SpO2: 95%   Weight: 229 lb 9.6 oz (104.1 kg)   Height: 5' 8\" (1.727 m)   PainSc:   9   PainLoc: Neck     Lab Data Reviewed:  Lab Results   Component Value Date/Time    WBC 9.6 08/03/2010 04:10 PM    HCT 30.1 (L) 08/03/2010 04:10 PM    HGB 10.1 (L) 08/03/2010 04:10 PM    PLATELET 240 21/76/3153 04:10 PM       Lab Results   Component Value Date/Time    Sodium 134 (L) 08/03/2010 04:10 PM    Potassium 4.4 08/03/2010 04:10 PM    Chloride 98 08/03/2010 04:10 PM    CO2 33 (H) 08/03/2010 04:10 PM    Glucose 97 08/03/2010 04:10 PM    BUN 10 08/03/2010 04:10 PM    Creatinine 0.8 08/03/2010 04:10 PM    Calcium 7.9 (L) 08/03/2010 04:10 PM       No components found for: TROPQUANT    No results found for: GARY      Lab Results   Component Value Date/Time    Hemoglobin A1c 5.8 07/22/2010 12:16 PM        No results found for: B12LT, FOL, RBCF    No results found for: GARY, ANARX, ANAIGG, XBANA    No results found for: CHOL, CHOLPOCT, CHOLX, CHLST, CHOLV, HDL, HDLPOC, LDL, LDLCPOC, LDLC, DLDLP, VLDLC, VLDL, TGLX, TRIGL, TRIGP, TGLPOCT, CHHD, CHHDX      CT Results (recent):    Results from Hospital Encounter encounter on 02/13/15   CT ABD PELV W CONT   Narrative **Final Report**      ICD Codes / Adm. Diagnosis: 789.00  070.54 / Abdominal pain, unspecified si    Chronic hepatitis C without   Examination:  CT ABD PELVIS W CON  - [id number] - Feb 13 2015  1:29PM  Accession No:  [id number]  Reason:  Hepatitis C, chronic, Abdominal pain, Constipated      REPORT:  EXAM: CT ABDOMEN PELVIS WITH CONTRAST    INDICATION:  Abdominal pain with chronic hepatitis C. COMPARISON: None. CONTRAST: 97 mL of Isovue-370. TECHNIQUE:   Multislice helical CT was performed from the diaphragm to the symphysis   pubis during uneventful rapid bolus intravenous contrast administration. Oral contrast was also administered . Contiguous 5 mm axial images   were reconstructed and lung and soft tissue windows were generated. Coronal   and sagittal reformations were generated. CT dose reduction was achieved   through use of a standardized protocol tailored for this examination and   automatic exposure control for dose modulation. FINDINGS:  The patient is obese. LOWER CHEST: The visualized portions of the lung bases are clear. ABDOMEN:  Liver: The liver is normal in size and contour with no focal abnormality. The attenuation of the liver is diffusely decreased throughout. Gallbladder and bile ducts: There is no calcified gallstone or biliary   dilatation. Spleen: No abnormality. Pancreas: No abnormality. Adrenal glands: No abnormality. Kidneys: No abnormality. PELVIS:  Reproductive organs: The uterus is absent. Bladder: No abnormality. BOWEL AND MESENTERY: The small bowel is normal.  There is no mesenteric mass   or adenopathy. The appendix    . PERITONEUM: There is no ascites or free intraperitoneal air. RETROPERITONEUM: The aorta  tapers without aneurysm. There are numerous   small lymph nodes throughout the retroperitoneum. There is no   retroperitoneal adenopathy or mass. There is no pelvic mass or adenopathy. BONES AND SOFT TISSUES: The patient is status post L4-L5 fusion with an   interbody bone graft and rods and pedicle screws. There are degenerative   disc changes at L2-L3 and L5-S1. IMPRESSION:   1. No acute abdominal or pelvic abnormality identified. 2. Obesity with hepatic steatosis. No focal hepatic abnormality. 3. Status post lumbar spine fusion at L4-L5 with lumbar spondylosis. Signing/Reading Doctor: Pito Alvarado ([id number])    Approved: Pito Alvarado ([id number])  Feb 13 2015  1:38PM                                   MRI Results (recent):    Results from Hospital Encounter encounter on 02/08/17   MRI LUMB SPINE WO CONT   Narrative Indication: Lumbar radiculitis after previous back surgery    Exam: MRI of the lumbar spine. Sequences include sagittal and axial T1 and  T2-weighted images. Sagittal STIR. Comparisons: July 8, 2011 and Cara 10, 2011    Contrast: None. Findings: There is slight retrolisthesis of L3 on L4. Patient is post L4-L5  interbody fusion and posterior decompression. There is susceptibility artifact  dorsal to the L3-L4 disc space. There is multilevel endplate degenerative  change. Cord terminates normally posterior to L1. Paraspinous soft tissues are  within normal limits. T12-L1: No stenosis    L1-L2: No stenosis    L2-L3: There is disc height loss and degeneration of this disc. There is a  diffuse disc bulge with slight narrowing of the canal. No foraminal narrowing    L3-L4: There is disc height loss with a diffuse disc bulge. There are bilateral  facet degenerative changes with thickening of the ligamentum flavum canal  stenosis is moderate which has progressed in the interval with narrowing of the  bilateral subarticular zones. There is moderate narrowing of the bilateral  foramen    L4-L5: This level has been decompressed without residual canal or foraminal  narrowing    L5-S1: There are bilateral facet degenerative changes with disc height loss and  a small disc osteophytic bulge. There is mild left greater than right foraminal  narrowing and mild narrowing of the canal, unchanged         Impression Impression:  1. Aggressive degeneration of the L3-L4 disc level now with moderate narrowing  of the canal and bilateral foramen  2. Postoperative changes L4-L5  3. Mild degenerative changes L2-3 and L5-S1 unchanged          IR Results (recent):   No results found for this or any previous visit. VAS/US Results (recent):  No results found for this or any previous visit. PHYSICAL EXAM:  General:    Alert, cooperative, no distress, appears stated age. Head:   Normocephalic, without obvious abnormality, atraumatic. Eyes:   Conjunctivae/corneas clear. PERRLA  Nose:  Nares normal. No drainage or sinus tenderness. Throat:    Lips, mucosa, and tongue normal.  No Thrush  Neck:  Supple, symmetrical,  no adenopathy, thyroid: non tender    no carotid bruit and no JVD. Paraspinal  Back:    Symmetric,  Bilateral tenderness. Lungs:   Clear to auscultation bilaterally. No Wheezing or Rhonchi. No rales. Chest wall:  No tenderness or deformity. No Accessory muscle use. Heart:   Regular rate and rhythm,  no murmur, rub or gallop. Abdomen:   Soft, non-tender. Not distended. Bowel sounds normal. No masses  Extremities: Extremities normal, atraumatic, No cyanosis. No edema. No clubbing  Skin:     Texture, turgor normal. No rashes or lesions. Not Jaundiced  Lymph nodes: Cervical, supraclavicular normal.  Psych:  Good insight. Not depressed. Not anxious or agitated. NEUROLOGICAL EXAM:  Appearance: The patient is well developed, well nourished, provides a coherent history and is in no acute distress. Mental Status: Oriented to time, place and person. Mood and affect appropriate. Cranial Nerves:   Intact visual fields. Fundi are benign. SHERIF, EOM's full, no nystagmus, no ptosis. Facial sensation is normal. Corneal reflexes are intact. Facial movement is symmetric. Hearing is normal bilaterally. Palate is midline with normal sternocleidomastoid and trapezius muscles are normal. Tongue is midline. Motor:  5/5 strength in upper and 4+/5 lower proximal and distal muscles. Normal bulk and tone. No fasciculations. Reflexes:   Deep tendon reflexes 2+/4 and symmetrical.   Sensory:   Decrease sensattion to touch, pinprick and vibration. Gait:  Abnormal gait. Tremor:   No tremor noted. Cerebellar:  No cerebellar signs present. Neurovascular:  Normal heart sounds and regular rhythm, peripheral pulses intact, and no carotid bruits. Assesment  1. Chronic pain syndrome    - COMPLIANCE DRUG SCREEN/PRESCRIPTION MONITORING  - HYDROmorphone (DILAUDID) 4 mg tablet; Take 1 Tab by mouth every twelve (12) hours every twelve (12) hours. Max Daily Amount: 8 mg.; Refill: 0    2. Medication refill    - HYDROmorphone (DILAUDID) 4 mg tablet; Take 1 Tab by mouth every twelve (12) hours every twelve (12) hours. Max Daily Amount: 8 mg.; Refill: 0  - diazePAM (VALIUM) 10 mg tablet; Dispense: 60 Tab; Refill: 1    3. Polyneuropathy    - COMPLIANCE DRUG SCREEN/PRESCRIPTION MONITORING    4. Use of opiates for therapeutic purposes    - COMPLIANCE DRUG SCREEN/PRESCRIPTION MONITORING    5. Cervicalgia  Continue management    6. Myofascial muscle pain  Continue management    ___________________________________________________  PLAN:Medication reviewed with patient      ICD-10-CM ICD-9-CM    1. Polyneuropathy G62.9 356.9 COMPLIANCE DRUG SCREEN/PRESCRIPTION MONITORING   2. Chronic pain syndrome G89.4 338.4 COMPLIANCE DRUG SCREEN/PRESCRIPTION MONITORING      HYDROmorphone (DILAUDID) 4 mg tablet   3. Medication refill Z76.0 V68.1 HYDROmorphone (DILAUDID) 4 mg tablet      diazePAM (VALIUM) 10 mg tablet   4. Use of opiates for therapeutic purposes Z79.891 V58.69 COMPLIANCE DRUG SCREEN/PRESCRIPTION MONITORING   5. Cervicalgia M54.2 723.1    6. Myofascial muscle pain M79.1 729.1      Follow-up Disposition:  Return in about 2 months (around 4/20/2018).            ___________________________________________________    Total time spent with patient:  []15   []25   []35   [] __ minutes    Care Plan discussed with:    [x]Patient   []Family    []Care Manager   []Consultant/Specialist :    ___________________________________________________    Attending Physician: Ranulfo Franklin MD

## 2018-02-24 LAB — DRUGS UR: NORMAL

## 2018-08-16 DIAGNOSIS — Z76.0 MEDICATION REFILL: ICD-10-CM

## 2018-08-16 RX ORDER — DIAZEPAM 10 MG/1
TABLET ORAL
Qty: 60 TAB | Refills: 3 | Status: SHIPPED | OUTPATIENT
Start: 2018-08-16 | End: 2018-12-04 | Stop reason: SDUPTHER

## 2018-12-04 DIAGNOSIS — Z76.0 MEDICATION REFILL: ICD-10-CM

## 2018-12-04 RX ORDER — DIAZEPAM 10 MG/1
TABLET ORAL
Qty: 60 TAB | Refills: 3 | Status: SHIPPED | OUTPATIENT
Start: 2018-12-04 | End: 2018-12-27 | Stop reason: SDUPTHER

## 2018-12-05 ENCOUNTER — TELEPHONE (OUTPATIENT)
Dept: NEUROLOGY | Age: 53
End: 2018-12-05

## 2018-12-05 NOTE — TELEPHONE ENCOUNTER
Patient made aware when called this morning her prescription is available, and she will have to come to the office to  the prescription.

## 2018-12-06 NOTE — TELEPHONE ENCOUNTER
Returned call to pharmacist due to the message left with the answering service regarding the patient's refill request for Valium. Pharmacist made aware the patient must pick the prescription up in the office and the patient was made aware on 12/5/18. Pharmacist stated the patient did not receive a refill of Valium on 126/18. She received it on 11/21/18.  pulled.

## 2018-12-27 ENCOUNTER — OFFICE VISIT (OUTPATIENT)
Dept: NEUROLOGY | Age: 53
End: 2018-12-27

## 2018-12-27 VITALS
HEART RATE: 89 BPM | DIASTOLIC BLOOD PRESSURE: 66 MMHG | WEIGHT: 196 LBS | SYSTOLIC BLOOD PRESSURE: 144 MMHG | RESPIRATION RATE: 12 BRPM | BODY MASS INDEX: 29.8 KG/M2 | OXYGEN SATURATION: 98 %

## 2018-12-27 DIAGNOSIS — G89.4 CHRONIC PAIN SYNDROME: ICD-10-CM

## 2018-12-27 DIAGNOSIS — Z76.0 MEDICATION REFILL: ICD-10-CM

## 2018-12-27 DIAGNOSIS — M79.18 MYOFASCIAL MUSCLE PAIN: Primary | ICD-10-CM

## 2018-12-27 DIAGNOSIS — F41.1 GAD (GENERALIZED ANXIETY DISORDER): ICD-10-CM

## 2018-12-27 DIAGNOSIS — M47.812 ARTHROPATHY OF CERVICAL FACET JOINT: ICD-10-CM

## 2018-12-27 RX ORDER — DIAZEPAM 10 MG/1
10 TABLET ORAL 2 TIMES DAILY
Qty: 60 TAB | Refills: 6 | Status: SHIPPED | OUTPATIENT
Start: 2018-12-27 | End: 2019-05-17 | Stop reason: SDUPTHER

## 2018-12-27 NOTE — PROGRESS NOTES
Neurology Progress Note    NAME:  Jaden Hartley   :   1965   MRN:   [id number]     Date/Time:  2018  Subjective:    Jaden Hartley is a 46 y.o. female here today for follow up for muscle spasm, headache, neck pain. Neck pain has reduced, according to patient she is currently not on any medication, and since then the pain has not gotten any worse, she tries to be careful what ever she does. She says she has been having cough for weeks now and that tend to aggravate her neck pain. She reports low back pain, sharp in nature, goes down to the legs, activity makes it worse. Patient says she is in constant pain and there appeares to be a lot of spasm. Patient was weaned off pain medication and she says she has not gone back to taking any narcotic as she has been trying to take care of it without narcotics. Patient was given diazepam for muscle spasm and anxiety. She was advised to follow-up with her primary care physician for her cough.   Review of Systems - General ROS: positive for  - fatigue, night sweats and sleep disturbance  Psychological ROS: positive for - anxiety, concentration difficulties, depression, mood swings, obsessive thoughts and sleep disturbances  Ophthalmic ROS: positive for - blurry vision  ENT ROS: positive for - headaches, vertigo and visual changes  Allergy and Immunology ROS: negative  Hematological and Lymphatic ROS: negative  Endocrine ROS: negative  Respiratory ROS: no cough, shortness of breath, or wheezing  Cardiovascular ROS: no chest pain or dyspnea on exertion  Gastrointestinal ROS: no abdominal pain, change in bowel habits, or black or bloody stools  Genito-Urinary ROS: no dysuria, trouble voiding, or hematuria  Musculoskeletal ROS: positive for - joint pain, joint stiffness, joint swelling, muscle pain and muscular weakness  Neurological ROS: positive for - dizziness, gait disturbance, headaches, impaired coordination/balance, numbness/tingling, visual changes and weakness  Dermatological ROS: negative    Medications reviewed:  Current Outpatient Medications   Medication Sig Dispense Refill    diazePAM (VALIUM) 10 mg tablet TAKE 1 TABLET BY MOUTH TWICE DAILY. *MAX DAILY AMOUNT: 20MG 60 Tab 3    ADVANCED GLUC METER TEST STRIP strip USE TO TEST BLOOD SUGAR UP TO TWICE A DAY AS DIRECTED  11    NEXIUM 24HR 20 mg TbEC TAKE 1 TABLET BY MOUTH EVERY DAY  4    BASAGLAR KWIKPEN 100 unit/mL (3 mL) inpn INJECT 30 UNITS UNDER THE SKIN DAILY  5    metFORMIN (GLUCOPHAGE) 500 mg tablet TAKE 1 TABLET BY MOUTH TWICE A DAY WITH MEALS  3    lisinopril (PRINIVIL, ZESTRIL) 20 mg tablet Take 20 mg by mouth daily.  albuterol (PROVENTIL, VENTOLIN) 90 mcg/Actuation inhaler Take 2 Puffs by inhalation every six (6) hours as needed.  HYDROmorphone (DILAUDID) 4 mg tablet Take 1 Tab by mouth every twelve (12) hours every twelve (12) hours. Max Daily Amount: 8 mg. 40 Tab 0    ibuprofen (MOTRIN) 800 mg tablet Take 1 Tab by mouth every eight (8) hours as needed for Pain. 40 Tab 3    VENTOLIN HFA 90 mcg/actuation inhaler INHALE 1 TO 2 PUFFS AS NEEDED EVERY 4 HOURS  2    benzonatate (TESSALON) 100 mg capsule TAKE ONE CAPSULE BY MOUTH 3 TIMES A DAY FOR 10 DAYS  1    gabapentin (NEURONTIN) 300 mg capsule TAKE 1 CAPSULE DAILY AS DIRECTED.  TAKE 1 AT NIGHT FOR FIVE DAYS THEN 3 TIMES A DAY  3    HYDROcodone-acetaminophen (NORCO)  mg tablet TAKE 1 TABLET BY MOUTH 3 TIMES A DAY AS DIRECTED FOR 30 DAYS  0    methocarbamol (ROBAXIN) 500 mg tablet TAKE 2 TABLETS 4 TIMES A DAY AS NEEDED FOR MUSCLE SPASM  0    methylPREDNISolone (MEDROL DOSEPACK) 4 mg tablet TAKE AS DIRECTED FOR 6 DAYS  0    oxyCODONE-acetaminophen (PERCOCET) 5-325 mg per tablet TK 1   TO  2  TS  PO  Q  4  TO  6  H  PRF  PAIN  0    traMADol (ULTRAM) 50 mg tablet TAKE 1 OR 2 TABLETS EVERY 6 HOURS AS NEEDED FOR PAIN  0    valsartan-hydroCHLOROthiazide (DIOVAN-HCT) 80-12.5 mg per tablet TAKE 1 TABLET BY MOUTH ONCE A DAY 30 DAY(S)  5    HYDROcodone-acetaminophen (NORCO) 5-325 mg per tablet Take 1 Tab by mouth every four (4) hours as needed for Pain. Max Daily Amount: 6 Tabs. 12 Tab 0    cyclobenzaprine (FLEXERIL) 10 mg tablet Take 1 Tab by mouth three (3) times daily as needed for Muscle Spasm(s). 20 Tab 0    hydrochlorothiazide (HYDRODIURIL) 25 mg tablet Take 25 mg by mouth daily. Objective:   Vitals:  Vitals:    12/27/18 1101   BP: 144/66   Pulse: 89   Resp: 12   SpO2: 98%   Weight: 196 lb (88.9 kg)               Lab Data Reviewed:  Lab Results   Component Value Date/Time    WBC 9.6 08/03/2010 04:10 PM    HCT 30.1 (L) 08/03/2010 04:10 PM    HGB 10.1 (L) 08/03/2010 04:10 PM    PLATELET 698 30/34/1884 04:10 PM       Lab Results   Component Value Date/Time    Sodium 134 (L) 08/03/2010 04:10 PM    Potassium 4.4 08/03/2010 04:10 PM    Chloride 98 08/03/2010 04:10 PM    CO2 33 (H) 08/03/2010 04:10 PM    Glucose 97 08/03/2010 04:10 PM    BUN 10 08/03/2010 04:10 PM    Creatinine 0.8 08/03/2010 04:10 PM    Calcium 7.9 (L) 08/03/2010 04:10 PM       No components found for: TROPQUANT    No results found for: GARY      Lab Results   Component Value Date/Time    Hemoglobin A1c 5.8 07/22/2010 12:16 PM        No results found for: B12LT, FOL, RBCF    No results found for: GARY, ANARX, ANAIGG, XBANA    No results found for: CHOL, CHOLPOCT, CHOLX, CHLST, CHOLV, HDL, HDLPOC, LDL, LDLCPOC, LDLC, DLDLP, VLDLC, VLDL, TGLX, TRIGL, TRIGP, TGLPOCT, CHHD, CHHDX      CT Results (recent):  Results from Hospital Encounter encounter on 02/13/15   CT ABD PELV W CONT    Narrative **Final Report**       ICD Codes / Adm. Diagnosis: 789.00  070.54 / Abdominal pain, unspecified si    Chronic hepatitis C without   Examination:  CT ABD PELVIS W CON  - [id number] - Feb 13 2015  1:29PM  Accession No:  [id number]  Reason:  Hepatitis C, chronic, Abdominal pain, Constipated      REPORT:  EXAM: CT ABDOMEN PELVIS WITH CONTRAST    INDICATION:  Abdominal pain with chronic hepatitis C.    COMPARISON: None. CONTRAST: 97 mL of Isovue-370. TECHNIQUE:   Multislice helical CT was performed from the diaphragm to the symphysis   pubis during uneventful rapid bolus intravenous contrast administration. Oral contrast was also administered . Contiguous 5 mm axial images   were reconstructed and lung and soft tissue windows were generated. Coronal   and sagittal reformations were generated. CT dose reduction was achieved   through use of a standardized protocol tailored for this examination and   automatic exposure control for dose modulation. FINDINGS:  The patient is obese. LOWER CHEST: The visualized portions of the lung bases are clear. ABDOMEN:  Liver: The liver is normal in size and contour with no focal abnormality. The attenuation of the liver is diffusely decreased throughout. Gallbladder and bile ducts: There is no calcified gallstone or biliary   dilatation. Spleen: No abnormality. Pancreas: No abnormality. Adrenal glands: No abnormality. Kidneys: No abnormality. PELVIS:  Reproductive organs: The uterus is absent. Bladder: No abnormality. BOWEL AND MESENTERY: The small bowel is normal.  There is no mesenteric mass   or adenopathy. The appendix    . PERITONEUM: There is no ascites or free intraperitoneal air. RETROPERITONEUM: The aorta  tapers without aneurysm. There are numerous   small lymph nodes throughout the retroperitoneum. There is no   retroperitoneal adenopathy or mass. There is no pelvic mass or adenopathy. BONES AND SOFT TISSUES: The patient is status post L4-L5 fusion with an   interbody bone graft and rods and pedicle screws. There are degenerative   disc changes at L2-L3 and L5-S1. IMPRESSION:   1. No acute abdominal or pelvic abnormality identified. 2. Obesity with hepatic steatosis. No focal hepatic abnormality.   3. Status post lumbar spine fusion at L4-L5 with lumbar spondylosis. Signing/Reading Doctor: Mateus Otero ([id number])    Approved: Mateus Otero ([id number])  Feb 13 2015  1:38PM                                   MRI Results (recent):  Results from Hospital Encounter encounter on 02/08/17   MRI LUMB SPINE WO CONT    Narrative Indication: Lumbar radiculitis after previous back surgery    Exam: MRI of the lumbar spine. Sequences include sagittal and axial T1 and  T2-weighted images. Sagittal STIR. Comparisons: July 8, 2011 and Cara 10, 2011    Contrast: None. Findings: There is slight retrolisthesis of L3 on L4. Patient is post L4-L5  interbody fusion and posterior decompression. There is susceptibility artifact  dorsal to the L3-L4 disc space. There is multilevel endplate degenerative  change. Cord terminates normally posterior to L1. Paraspinous soft tissues are  within normal limits. T12-L1: No stenosis    L1-L2: No stenosis    L2-L3: There is disc height loss and degeneration of this disc. There is a  diffuse disc bulge with slight narrowing of the canal. No foraminal narrowing    L3-L4: There is disc height loss with a diffuse disc bulge. There are bilateral  facet degenerative changes with thickening of the ligamentum flavum canal  stenosis is moderate which has progressed in the interval with narrowing of the  bilateral subarticular zones. There is moderate narrowing of the bilateral  foramen    L4-L5: This level has been decompressed without residual canal or foraminal  narrowing    L5-S1: There are bilateral facet degenerative changes with disc height loss and  a small disc osteophytic bulge. There is mild left greater than right foraminal  narrowing and mild narrowing of the canal, unchanged      Impression Impression:  1. Aggressive degeneration of the L3-L4 disc level now with moderate narrowing  of the canal and bilateral foramen  2. Postoperative changes L4-L5  3.  Mild degenerative changes L2-3 and L5-S1 unchanged          IR Results (recent):  No results found for this or any previous visit. VAS/US Results (recent):  No results found for this or any previous visit. PHYSICAL EXAM:  General:    Alert, cooperative, no distress, appears stated age. Head:   Normocephalic, without obvious abnormality, atraumatic. Eyes:   Conjunctivae/corneas clear. PERRLA  Nose:  Nares normal. No drainage or sinus tenderness. Throat:    Lips, mucosa, and tongue normal.  No Thrush  Neck:  Supple, symmetrical,  no adenopathy, thyroid: non tender    no carotid bruit and no JVD. Paraspinal tenderness  Back:    Symmetric, bilateral tenderness. Lungs:   Clear to auscultation bilaterally. No Wheezing or Rhonchi. No rales. Chest wall:  No tenderness or deformity. No Accessory muscle use. Heart:   Regular rate and rhythm,  no murmur, rub or gallop. Abdomen:   Soft, non-tender. Not distended. Bowel sounds normal. No masses  Extremities: Extremities normal, atraumatic, No cyanosis. No edema. No clubbing  Skin:     Texture, turgor normal. No rashes or lesions. Not Jaundiced  Lymph nodes: Cervical, supraclavicular normal.  Psych:  Good insight. Not depressed. Not anxious or agitated. NEUROLOGICAL EXAM:  Appearance: The patient is well developed, well nourished, provides a coherent history and is in no acute distress. Mental Status: Oriented to time, place and person. Mood and affect appropriate. Cranial Nerves:   Intact visual fields. Fundi are benign. SHERIF, EOM's full, no nystagmus, no ptosis. Facial sensation is normal. Corneal reflexes are intact. Facial movement is symmetric. Hearing is normal bilaterally. Palate is midline with normal sternocleidomastoid and trapezius muscles are normal. Tongue is midline. Motor:  5-/5 strength in upper and lower proximal and distal muscles. Normal bulk and tone. No fasciculations. Reflexes:   Deep tendon reflexes 2+/4 and symmetrical.   Sensory:    Dysesthesia to touch, pinprick and vibration.    Gait: Normal gait. Tremor:   No tremor noted. Cerebellar:  No cerebellar signs present. Neurovascular:  Normal heart sounds and regular rhythm, peripheral pulses intact, and no carotid bruits. Assesment  1. Myofascial muscle pain  Continue current management    2. Chronic pain syndrome  Physical therapy    3. Arthropathy of cervical facet joint  Physical therapy    4. HOWARD (generalized anxiety disorder)  Diazepam 10 mg p.o. twice daily    ___________________________________________________  PLAN: Medication and plan discussed with patient      ICD-10-CM ICD-9-CM    1. Myofascial muscle pain M79.18 729.1    2. Chronic pain syndrome G89.4 338.4    3.  Arthropathy of cervical facet joint M47.812 721.0    4. HOWARD (generalized anxiety disorder) F41.1 300.02      Follow-up Disposition:  Return in about 6 months (around 6/27/2019).         ___________________________________________________    Total time spent with patient:  []15   []25   []35   [] __ minutes    Care Plan discussed with:    []Patient   []Family    []Care Manager   []Consultant/Specialist :    ___________________________________________________    Attending Physician: Ted Joyner MD

## 2019-01-12 ENCOUNTER — APPOINTMENT (OUTPATIENT)
Dept: GENERAL RADIOLOGY | Age: 54
End: 2019-01-12
Attending: EMERGENCY MEDICINE
Payer: COMMERCIAL

## 2019-01-12 ENCOUNTER — HOSPITAL ENCOUNTER (EMERGENCY)
Age: 54
Discharge: HOME | End: 2019-01-12
Attending: EMERGENCY MEDICINE
Payer: COMMERCIAL

## 2019-01-12 VITALS
TEMPERATURE: 97.5 F | BODY MASS INDEX: 29.18 KG/M2 | DIASTOLIC BLOOD PRESSURE: 71 MMHG | SYSTOLIC BLOOD PRESSURE: 102 MMHG | HEIGHT: 69 IN | WEIGHT: 197 LBS | HEART RATE: 87 BPM | RESPIRATION RATE: 14 BRPM

## 2019-01-12 DIAGNOSIS — M54.50 ACUTE MIDLINE LOW BACK PAIN WITHOUT SCIATICA: Primary | ICD-10-CM

## 2019-01-12 DIAGNOSIS — N30.00 ACUTE CYSTITIS WITHOUT HEMATURIA: ICD-10-CM

## 2019-01-12 LAB
APPEARANCE UR: ABNORMAL
BACTERIA URNS QL MICRO: ABNORMAL /HPF
BILIRUB UR QL CFM: NEGATIVE
COLOR UR: ABNORMAL
EPITH CASTS URNS QL MICRO: ABNORMAL /LPF
GLUCOSE UR STRIP.AUTO-MCNC: NEGATIVE MG/DL
HGB UR QL STRIP: ABNORMAL
KETONES UR QL STRIP.AUTO: NEGATIVE MG/DL
LEUKOCYTE ESTERASE UR QL STRIP.AUTO: ABNORMAL
NITRITE UR QL STRIP.AUTO: POSITIVE
PH UR STRIP: 5 [PH] (ref 5–8)
PROT UR STRIP-MCNC: ABNORMAL MG/DL
RBC #/AREA URNS HPF: ABNORMAL /HPF (ref 0–5)
SP GR UR REFRACTOMETRY: 1.01 (ref 1–1.03)
UR CULT HOLD, URHOLD: NORMAL
UROBILINOGEN UR QL STRIP.AUTO: 4 EU/DL (ref 0.2–1)
WBC URNS QL MICRO: ABNORMAL /HPF (ref 0–4)

## 2019-01-12 PROCEDURE — 81001 URINALYSIS AUTO W/SCOPE: CPT

## 2019-01-12 PROCEDURE — 72100 X-RAY EXAM L-S SPINE 2/3 VWS: CPT

## 2019-01-12 PROCEDURE — 74011250637 HC RX REV CODE- 250/637

## 2019-01-12 PROCEDURE — 99283 EMERGENCY DEPT VISIT LOW MDM: CPT

## 2019-01-12 RX ORDER — ACETAMINOPHEN 325 MG/1
650 TABLET ORAL
Status: COMPLETED | OUTPATIENT
Start: 2019-01-12 | End: 2019-01-12

## 2019-01-12 RX ORDER — CEFUROXIME AXETIL 500 MG/1
500 TABLET ORAL 2 TIMES DAILY
Qty: 14 TAB | Refills: 0 | Status: SHIPPED | OUTPATIENT
Start: 2019-01-12 | End: 2019-01-19

## 2019-01-12 RX ORDER — ACETAMINOPHEN 325 MG/1
TABLET ORAL
Status: COMPLETED
Start: 2019-01-12 | End: 2019-01-12

## 2019-01-12 RX ORDER — LIDOCAINE 50 MG/G
PATCH TOPICAL
Qty: 7 EACH | Refills: 0 | Status: SHIPPED | OUTPATIENT
Start: 2019-01-12

## 2019-01-12 RX ADMIN — ACETAMINOPHEN 650 MG: 325 TABLET ORAL at 12:23

## 2019-01-12 NOTE — ED PROVIDER NOTES
46 y.o. female with past medical history significant for HTN, asthma, migraine, and diabetes who presents from home via private vehicle with chief complaint of lower back pain. Patient report chronic lower back pain increasingly worsened over the last week. Back pain has been present for 6-7 months. She also reports numbness of 2 fingers on her left hand, and she c/o chronic knee pain. She also notes \"changes to her urinary frequency\". Patient specifically denies injury or fall, and abdominal pain. There are no other acute medical concerns at this time. Social hx: Current some day tobacco smoker; Denies EtOH use; Denies illicit drug use PCP: Jorge Burgos MD 
 
Note written by Dell Lal, as dictated by Marialuisa Hayden MD 10:56 AM 
 
 
The history is provided by the patient. No  was used. Past Medical History:  
Diagnosis Date  Asthma  Diabetes ([facility] [address]  Frequent headaches  Hypertension  Joint pain  Migraine  Muscle pain  Unintentional weight change Past Surgical History:  
Procedure Laterality Date  HX GYN    
 hysterectomy  HX LUMBAR FUSION    
 HX ORTHOPAEDIC    
 knee surg  NEUROLOGICAL PROCEDURE UNLISTED  July 2010  
 lumbar spine surgery Family History:  
Problem Relation Age of Onset  Cancer Mother   
     lung Social History Socioeconomic History  Marital status: SINGLE Spouse name: Not on file  Number of children: Not on file  Years of education: Not on file  Highest education level: Not on file Social Needs  Financial resource strain: Not on file  Food insecurity - worry: Not on file  Food insecurity - inability: Not on file  Transportation needs - medical: Not on file  Transportation needs - non-medical: Not on file Occupational History  Not on file Tobacco Use  Smoking status: Current Some Day Smoker  Smokeless tobacco: Never Used Substance and Sexual Activity  Alcohol use: No  
 Drug use: No  
 Sexual activity: No  
Other Topics Concern  Not on file Social History Narrative  Not on file ALLERGIES: Chantix [varenicline]; Gabapentin; and Motrin [ibuprofen] Review of Systems Constitutional: Negative for chills and fever. HENT: Negative for rhinorrhea and sore throat. Respiratory: Negative for cough and shortness of breath. Cardiovascular: Negative for chest pain. Gastrointestinal: Negative for abdominal pain, diarrhea, nausea and vomiting. Genitourinary: Positive for frequency. Negative for dysuria and urgency. Musculoskeletal: Positive for arthralgias and back pain. Skin: Negative for rash. Neurological: Positive for numbness. Negative for dizziness, weakness and light-headedness. All other systems reviewed and are negative. There were no vitals filed for this visit. Physical Exam  
 
Vital signs reviewed. Nursing notes reviewed. Const:  No acute distress, well developed, well nourished Head:  Atraumatic, normocephalic Eyes:  PERRL, conjunctiva normal, no scleral icterus Neck:  Supple, trachea midline Cardiovascular:  RRR, no murmurs, no gallops, no rubs Resp:  No resp distress, no increased work of breathing, no wheezes, no rhonchi, no rales, Abd:  Soft, non-tender, non-distended, no rebound, no guarding, no CVA tenderness :  Deferred MSK:  No pedal edema, normal ROM Numbness to left index and thumb on palmar aspect. Mild diffuse lumbar spine tenderness. Neuro:  Alert and oriented x3, no cranial nerve defect Skin:  Warm, dry, intact Psych: normal mood and affect, behavior is normal, judgement and thought content is normal 
 
Note written by Dell Pepe, as dictated by Yeni Pollack MD 10:56 AM 
 
MDM Number of Diagnoses or Management Options Acute cystitis without hematuria:  
Acute midline low back pain without sciatica:  
  
 Amount and/or Complexity of Data Reviewed Clinical lab tests: ordered and reviewed Tests in the radiology section of CPT®: ordered and reviewed Review and summarize past medical records: yes Pt. Presents to the ER with an acute exacerbation of her chronic back pain. No signs/sx of cord compression. Pt. Found to have a UTI. I will start her on ceftin and a lidoderm patch. Pt. To f/u with her PCP or return to the ER with worsening sx. Procedures

## 2019-01-12 NOTE — ED TRIAGE NOTES
Back pain for a week, points to right lower back, prior to that was better for a few months. NO change in bladder or bowel. Also reports that upon waking her right thumb and index finger is numb on palm side of digits this morning.

## 2019-01-12 NOTE — DISCHARGE INSTRUCTIONS
Patient Education        Back Pain: Care Instructions  Your Care Instructions    Back pain has many possible causes. It is often related to problems with muscles and ligaments of the back. It may also be related to problems with the nerves, discs, or bones of the back. Moving, lifting, standing, sitting, or sleeping in an awkward way can strain the back. Sometimes you don't notice the injury until later. Arthritis is another common cause of back pain. Although it may hurt a lot, back pain usually improves on its own within several weeks. Most people recover in 12 weeks or less. Using good home treatment and being careful not to stress your back can help you feel better sooner. Follow-up care is a key part of your treatment and safety. Be sure to make and go to all appointments, and call your doctor if you are having problems. It's also a good idea to know your test results and keep a list of the medicines you take. How can you care for yourself at home? · Sit or lie in positions that are most comfortable and reduce your pain. Try one of these positions when you lie down:  ? Lie on your back with your knees bent and supported by large pillows. ? Lie on the floor with your legs on the seat of a sofa or chair. ? Lie on your side with your knees and hips bent and a pillow between your legs. ? Lie on your stomach if it does not make pain worse. · Do not sit up in bed, and avoid soft couches and twisted positions. Bed rest can help relieve pain at first, but it delays healing. Avoid bed rest after the first day of back pain. · Change positions every 30 minutes. If you must sit for long periods of time, take breaks from sitting. Get up and walk around, or lie in a comfortable position. · Try using a heating pad on a low or medium setting for 15 to 20 minutes every 2 or 3 hours. Try a warm shower in place of one session with the heating pad. · You can also try an ice pack for 10 to 15 minutes every 2 to 3 hours. Put a thin cloth between the ice pack and your skin. · Take pain medicines exactly as directed. ? If the doctor gave you a prescription medicine for pain, take it as prescribed. ? If you are not taking a prescription pain medicine, ask your doctor if you can take an over-the-counter medicine. · Take short walks several times a day. You can start with 5 to 10 minutes, 3 or 4 times a day, and work up to longer walks. Walk on level surfaces and avoid hills and stairs until your back is better. · Return to work and other activities as soon as you can. Continued rest without activity is usually not good for your back. · To prevent future back pain, do exercises to stretch and strengthen your back and stomach. Learn how to use good posture, safe lifting techniques, and proper body mechanics. When should you call for help? Call your doctor now or seek immediate medical care if:    · You have new or worsening numbness in your legs.     · You have new or worsening weakness in your legs. (This could make it hard to stand up.)     · You lose control of your bladder or bowels.    Watch closely for changes in your health, and be sure to contact your doctor if:    · You have a fever, lose weight, or don't feel well.     · You do not get better as expected. Where can you learn more? Go to http://mayra-padmini.info/. Enter I910 in the search box to learn more about \"Back Pain: Care Instructions. \"  Current as of: November 29, 2017  Content Version: 11.8  © 3836-8552 [employer]. Care instructions adapted under license by [employer] (which disclaims liability or warranty for this information). If you have questions about a medical condition or this instruction, always ask your healthcare professional. [place] [address] any warranty or liability for your use of this information.

## 2019-05-17 DIAGNOSIS — Z76.0 MEDICATION REFILL: ICD-10-CM

## 2019-05-17 RX ORDER — DIAZEPAM 10 MG/1
TABLET ORAL
Qty: 60 TAB | Refills: 0 | Status: SHIPPED | OUTPATIENT
Start: 2019-05-17 | End: 2019-08-12 | Stop reason: SDUPTHER

## 2019-08-12 DIAGNOSIS — Z76.0 MEDICATION REFILL: ICD-10-CM

## 2019-08-12 RX ORDER — DIAZEPAM 10 MG/1
TABLET ORAL
Qty: 60 TAB | Refills: 0 | Status: SHIPPED | OUTPATIENT
Start: 2019-08-12

## 2022-02-21 ENCOUNTER — HOSPITAL ENCOUNTER (EMERGENCY)
Age: 57
Discharge: HOME | End: 2022-02-21
Attending: EMERGENCY MEDICINE
Payer: MEDICAID

## 2022-02-21 ENCOUNTER — APPOINTMENT (OUTPATIENT)
Dept: CT IMAGING | Age: 57
End: 2022-02-21
Attending: EMERGENCY MEDICINE
Payer: MEDICAID

## 2022-02-21 VITALS
SYSTOLIC BLOOD PRESSURE: 127 MMHG | HEIGHT: 68 IN | RESPIRATION RATE: 17 BRPM | OXYGEN SATURATION: 94 % | DIASTOLIC BLOOD PRESSURE: 68 MMHG | BODY MASS INDEX: 33.85 KG/M2 | WEIGHT: 223.33 LBS | TEMPERATURE: 98.4 F | HEART RATE: 93 BPM

## 2022-02-21 DIAGNOSIS — K85.90 ACUTE PANCREATITIS, UNSPECIFIED COMPLICATION STATUS, UNSPECIFIED PANCREATITIS TYPE: Primary | ICD-10-CM

## 2022-02-21 DIAGNOSIS — N39.0 URINARY TRACT INFECTION WITHOUT HEMATURIA, SITE UNSPECIFIED: ICD-10-CM

## 2022-02-21 DIAGNOSIS — R11.2 NAUSEA AND VOMITING, INTRACTABILITY OF VOMITING NOT SPECIFIED, UNSPECIFIED VOMITING TYPE: ICD-10-CM

## 2022-02-21 LAB
ALBUMIN SERPL-MCNC: 3.7 G/DL (ref 3.5–5)
ALBUMIN/GLOB SERPL: 0.9 {RATIO} (ref 1.1–2.2)
ALP SERPL-CCNC: 81 U/L (ref 45–117)
ALT SERPL-CCNC: 49 U/L (ref 12–78)
ANION GAP SERPL CALC-SCNC: 7 MMOL/L (ref 5–15)
APPEARANCE UR: ABNORMAL
AST SERPL-CCNC: 32 U/L (ref 15–37)
BACTERIA URNS QL MICRO: ABNORMAL /HPF
BASOPHILS # BLD: 0.1 K/UL (ref 0–0.1)
BASOPHILS NFR BLD: 1 % (ref 0–1)
BILIRUB SERPL-MCNC: 0.4 MG/DL (ref 0.2–1)
BILIRUB UR QL: NEGATIVE
BUN SERPL-MCNC: 19 MG/DL (ref 6–20)
BUN/CREAT SERPL: 16 (ref 12–20)
CALCIUM SERPL-MCNC: 9 MG/DL (ref 8.5–10.1)
CHLORIDE SERPL-SCNC: 101 MMOL/L (ref 97–108)
CO2 SERPL-SCNC: 28 MMOL/L (ref 21–32)
COLOR UR: ABNORMAL
CREAT SERPL-MCNC: 1.17 MG/DL (ref 0.55–1.02)
DIFFERENTIAL METHOD BLD: NORMAL
EOSINOPHIL # BLD: 0.2 K/UL (ref 0–0.4)
EOSINOPHIL NFR BLD: 2 % (ref 0–7)
EPITH CASTS URNS QL MICRO: ABNORMAL /LPF
ERYTHROCYTE [DISTWIDTH] IN BLOOD BY AUTOMATED COUNT: 12.3 % (ref 11.5–14.5)
GLOBULIN SER CALC-MCNC: 4.1 G/DL (ref 2–4)
GLUCOSE SERPL-MCNC: 109 MG/DL (ref 65–100)
GLUCOSE UR STRIP.AUTO-MCNC: NEGATIVE MG/DL
HCT VFR BLD AUTO: 43.6 % (ref 35–47)
HGB BLD-MCNC: 14.3 G/DL (ref 11.5–16)
HGB UR QL STRIP: NEGATIVE
IMM GRANULOCYTES # BLD AUTO: 0 K/UL (ref 0–0.04)
IMM GRANULOCYTES NFR BLD AUTO: 0 % (ref 0–0.5)
KETONES UR QL STRIP.AUTO: NEGATIVE MG/DL
LEUKOCYTE ESTERASE UR QL STRIP.AUTO: ABNORMAL
LIPASE SERPL-CCNC: 1006 U/L (ref 73–393)
LYMPHOCYTES # BLD: 2 K/UL (ref 0.8–3.5)
LYMPHOCYTES NFR BLD: 27 % (ref 12–49)
MCH RBC QN AUTO: 30 PG (ref 26–34)
MCHC RBC AUTO-ENTMCNC: 32.8 G/DL (ref 30–36.5)
MCV RBC AUTO: 91.6 FL (ref 80–99)
MONOCYTES # BLD: 0.5 K/UL (ref 0–1)
MONOCYTES NFR BLD: 6 % (ref 5–13)
MUCOUS THREADS URNS QL MICRO: ABNORMAL /LPF
NEUTS SEG # BLD: 4.8 K/UL (ref 1.8–8)
NEUTS SEG NFR BLD: 64 % (ref 32–75)
NITRITE UR QL STRIP.AUTO: NEGATIVE
NRBC # BLD: 0 K/UL (ref 0–0.01)
NRBC BLD-RTO: 0 PER 100 WBC
PH UR STRIP: 5.5 [PH] (ref 5–8)
PLATELET # BLD AUTO: 226 K/UL (ref 150–400)
PMV BLD AUTO: 11.1 FL (ref 8.9–12.9)
POTASSIUM SERPL-SCNC: 4.6 MMOL/L (ref 3.5–5.1)
PROT SERPL-MCNC: 7.8 G/DL (ref 6.4–8.2)
PROT UR STRIP-MCNC: NEGATIVE MG/DL
RBC # BLD AUTO: 4.76 M/UL (ref 3.8–5.2)
RBC #/AREA URNS HPF: ABNORMAL /HPF (ref 0–5)
SODIUM SERPL-SCNC: 136 MMOL/L (ref 136–145)
SP GR UR REFRACTOMETRY: 1.03 (ref 1–1.03)
UROBILINOGEN UR QL STRIP.AUTO: 1 EU/DL (ref 0.2–1)
WBC # BLD AUTO: 7.6 K/UL (ref 3.6–11)
WBC URNS QL MICRO: ABNORMAL /HPF (ref 0–4)

## 2022-02-21 PROCEDURE — 80053 COMPREHEN METABOLIC PANEL: CPT

## 2022-02-21 PROCEDURE — 74177 CT ABD & PELVIS W/CONTRAST: CPT

## 2022-02-21 PROCEDURE — 96375 TX/PRO/DX INJ NEW DRUG ADDON: CPT

## 2022-02-21 PROCEDURE — 81001 URINALYSIS AUTO W/SCOPE: CPT

## 2022-02-21 PROCEDURE — 93005 ELECTROCARDIOGRAM TRACING: CPT

## 2022-02-21 PROCEDURE — 85025 COMPLETE CBC W/AUTO DIFF WBC: CPT

## 2022-02-21 PROCEDURE — 36415 COLL VENOUS BLD VENIPUNCTURE: CPT

## 2022-02-21 PROCEDURE — 74011250636 HC RX REV CODE- 250/636: Performed by: EMERGENCY MEDICINE

## 2022-02-21 PROCEDURE — 83690 ASSAY OF LIPASE: CPT

## 2022-02-21 PROCEDURE — 74011000636 HC RX REV CODE- 636: Performed by: EMERGENCY MEDICINE

## 2022-02-21 PROCEDURE — 96374 THER/PROPH/DIAG INJ IV PUSH: CPT

## 2022-02-21 PROCEDURE — 96376 TX/PRO/DX INJ SAME DRUG ADON: CPT

## 2022-02-21 PROCEDURE — 99284 EMERGENCY DEPT VISIT MOD MDM: CPT

## 2022-02-21 RX ORDER — CEPHALEXIN 500 MG/1
500 CAPSULE ORAL 4 TIMES DAILY
Qty: 28 CAPSULE | Refills: 0 | Status: SHIPPED | OUTPATIENT
Start: 2022-02-21 | End: 2022-02-28

## 2022-02-21 RX ORDER — HYDROMORPHONE HYDROCHLORIDE 1 MG/ML
0.5 INJECTION, SOLUTION INTRAMUSCULAR; INTRAVENOUS; SUBCUTANEOUS
Status: COMPLETED | OUTPATIENT
Start: 2022-02-21 | End: 2022-02-21

## 2022-02-21 RX ORDER — ONDANSETRON 4 MG/1
4 TABLET, FILM COATED ORAL
Qty: 20 TABLET | Refills: 0 | Status: SHIPPED | OUTPATIENT
Start: 2022-02-21 | End: 2022-02-21

## 2022-02-21 RX ORDER — ONDANSETRON 2 MG/ML
4 INJECTION INTRAMUSCULAR; INTRAVENOUS
Status: COMPLETED | OUTPATIENT
Start: 2022-02-21 | End: 2022-02-21

## 2022-02-21 RX ORDER — ONDANSETRON 4 MG/1
4 TABLET, FILM COATED ORAL
Qty: 20 TABLET | Refills: 0 | Status: SHIPPED | OUTPATIENT
Start: 2022-02-21

## 2022-02-21 RX ORDER — OXYCODONE HYDROCHLORIDE 5 MG/1
5 TABLET ORAL
Qty: 12 TABLET | Refills: 0 | Status: SHIPPED | OUTPATIENT
Start: 2022-02-21 | End: 2022-02-21

## 2022-02-21 RX ORDER — OXYCODONE HYDROCHLORIDE 5 MG/1
5 TABLET ORAL
Qty: 12 TABLET | Refills: 0 | Status: SHIPPED | OUTPATIENT
Start: 2022-02-21 | End: 2022-02-24

## 2022-02-21 RX ORDER — MORPHINE SULFATE 2 MG/ML
4 INJECTION, SOLUTION INTRAMUSCULAR; INTRAVENOUS
Status: COMPLETED | OUTPATIENT
Start: 2022-02-21 | End: 2022-02-21

## 2022-02-21 RX ADMIN — SODIUM CHLORIDE 1000 ML: 9 INJECTION, SOLUTION INTRAVENOUS at 17:35

## 2022-02-21 RX ADMIN — MORPHINE SULFATE 4 MG: 2 INJECTION, SOLUTION INTRAMUSCULAR; INTRAVENOUS at 17:35

## 2022-02-21 RX ADMIN — ONDANSETRON 4 MG: 2 INJECTION INTRAMUSCULAR; INTRAVENOUS at 17:35

## 2022-02-21 RX ADMIN — HYDROMORPHONE HYDROCHLORIDE 0.5 MG: 1 INJECTION, SOLUTION INTRAMUSCULAR; INTRAVENOUS; SUBCUTANEOUS at 20:27

## 2022-02-21 RX ADMIN — IOPAMIDOL 100 ML: 755 INJECTION, SOLUTION INTRAVENOUS at 17:52

## 2022-02-21 RX ADMIN — HYDROMORPHONE HYDROCHLORIDE 0.5 MG: 1 INJECTION, SOLUTION INTRAMUSCULAR; INTRAVENOUS; SUBCUTANEOUS at 18:21

## 2022-02-21 NOTE — ED PROVIDER NOTES
EMERGENCY DEPARTMENT HISTORY AND PHYSICAL EXAM      Date: 2/21/2022  Patient Name: Cristina Flores    History of Presenting Illness     Chief Complaint   Patient presents with    Abdominal Pain     LLQ pain radiates to RLQ; +vomiting up \"green bile\"     Vomiting    Urinary Pain     feels like I have to \"pee all the time\"       History Provided By: Patient    HPI: Cristina Flores, 61 y.o. female with PMHx as noted below presents the emergency department chief complaint of abdominal pain. Patient reports onset of right upper quadrant abdominal pain yesterday evening. Patient notes the pain is now diffuse and severe. Pain is currently nonradiating. She reports nausea, vomiting as well as urinary frequency. Symptoms otherwise have been constant. Pt denies any other alleviating or exacerbating factors. Additionally, pt specifically denies any recent fever, chills, headache, CP, SOB, lightheadedness, dizziness, numbness, weakness, BLE swelling, heart palpitations,  diarrhea, constipation, melena, hematochezia, cough, or congestion. PCP: Chan Durán MD    Current Outpatient Medications   Medication Sig Dispense Refill    ondansetron hcl (Zofran) 4 mg tablet Take 1 Tablet by mouth every eight (8) hours as needed for Nausea. 20 Tablet 0    oxyCODONE IR (Roxicodone) 5 mg immediate release tablet Take 1 Tablet by mouth every six (6) hours as needed for Pain for up to 3 days. Max Daily Amount: 20 mg. 12 Tablet 0    cephALEXin (Keflex) 500 mg capsule Take 1 Capsule by mouth four (4) times daily for 7 days. 28 Capsule 0    diazePAM (VALIUM) 10 mg tablet TAKE 1 TABLET TWICE A DAY 60 Tab 0    lidocaine (LIDODERM) 5 % Apply patch to the affected area for 12 hours a day and remove for 12 hours a day. 7 Each 0    HYDROmorphone (DILAUDID) 4 mg tablet Take 1 Tab by mouth every twelve (12) hours every twelve (12) hours.  Max Daily Amount: 8 mg. 40 Tab 0    ibuprofen (MOTRIN) 800 mg tablet Take 1 Tab by mouth every eight (8) hours as needed for Pain. 40 Tab 3    VENTOLIN HFA 90 mcg/actuation inhaler INHALE 1 TO 2 PUFFS AS NEEDED EVERY 4 HOURS  2    benzonatate (TESSALON) 100 mg capsule TAKE ONE CAPSULE BY MOUTH 3 TIMES A DAY FOR 10 DAYS  1    ADVANCED GLUC METER TEST STRIP strip USE TO TEST BLOOD SUGAR UP TO TWICE A DAY AS DIRECTED  11    NEXIUM 24HR 20 mg TbEC TAKE 1 TABLET BY MOUTH EVERY DAY  4    gabapentin (NEURONTIN) 300 mg capsule TAKE 1 CAPSULE DAILY AS DIRECTED. TAKE 1 AT NIGHT FOR FIVE DAYS THEN 3 TIMES A DAY  3    HYDROcodone-acetaminophen (NORCO)  mg tablet TAKE 1 TABLET BY MOUTH 3 TIMES A DAY AS DIRECTED FOR 30 DAYS  0    BASAGLAR KWIKPEN 100 unit/mL (3 mL) inpn INJECT 30 UNITS UNDER THE SKIN DAILY  5    metFORMIN (GLUCOPHAGE) 500 mg tablet TAKE 1 TABLET BY MOUTH TWICE A DAY WITH MEALS  3    methocarbamol (ROBAXIN) 500 mg tablet TAKE 2 TABLETS 4 TIMES A DAY AS NEEDED FOR MUSCLE SPASM  0    methylPREDNISolone (MEDROL DOSEPACK) 4 mg tablet TAKE AS DIRECTED FOR 6 DAYS  0    oxyCODONE-acetaminophen (PERCOCET) 5-325 mg per tablet TK 1   TO  2  TS  PO  Q  4  TO  6  H  PRF  PAIN  0    traMADol (ULTRAM) 50 mg tablet TAKE 1 OR 2 TABLETS EVERY 6 HOURS AS NEEDED FOR PAIN  0    valsartan-hydroCHLOROthiazide (DIOVAN-HCT) 80-12.5 mg per tablet TAKE 1 TABLET BY MOUTH ONCE A DAY 30 DAY(S)  5    HYDROcodone-acetaminophen (NORCO) 5-325 mg per tablet Take 1 Tab by mouth every four (4) hours as needed for Pain. Max Daily Amount: 6 Tabs. 12 Tab 0    cyclobenzaprine (FLEXERIL) 10 mg tablet Take 1 Tab by mouth three (3) times daily as needed for Muscle Spasm(s). 20 Tab 0    hydrochlorothiazide (HYDRODIURIL) 25 mg tablet Take 25 mg by mouth daily.  lisinopril (PRINIVIL, ZESTRIL) 20 mg tablet Take 20 mg by mouth daily.  albuterol (PROVENTIL, VENTOLIN) 90 mcg/Actuation inhaler Take 2 Puffs by inhalation every six (6) hours as needed.          Past History     Past Medical History:  Past Medical History: Diagnosis Date    Asthma     Diabetes ([facility] [address]     Frequent headaches     Hypertension     Joint pain     Migraine     Muscle pain     Unintentional weight change        Past Surgical History:  Past Surgical History:   Procedure Laterality Date    HX GYN      hysterectomy    HX LUMBAR FUSION      HX ORTHOPAEDIC      knee surg    NEUROLOGICAL PROCEDURE UNLISTED  July 2010    lumbar spine surgery       Family History:  Family History   Problem Relation Age of Onset    Cancer Mother         lung       Social History:  Social History     Tobacco Use    Smoking status: Current Some Day Smoker    Smokeless tobacco: Never Used   Substance Use Topics    Alcohol use: No    Drug use: No       Allergies: Allergies   Allergen Reactions    Chantix [Varenicline] Itching    Gabapentin Swelling    Motrin [Ibuprofen] Swelling         Review of Systems   Review of Systems  Constitutional: Negative for fever, chills, and fatigue. HENT: Negative for congestion, sore throat, rhinorrhea, sneezing and neck stiffness   Eyes: Negative for discharge and redness. Respiratory: Negative for  shortness of breath, wheezing   Cardiovascular: Negative for chest pain, palpitations   Gastrointestinal: Positive for nausea, vomiting, abdominal pain. Negative constipation, diarrhea and blood in stool. Genitourinary: Positive urgency, frequency, hematuria. Negative flank pain, decreased urine volume, discharge,   Musculoskeletal: Negative for myalgias or joint pain . Skin: Negative for rash or lesions . Neurological: Negative weakness, light-headedness, numbness and headaches. Physical Exam   Physical Exam    GENERAL: alert and oriented, moderate distress  EYES: PEERL, No injection, discharge or icterus. ENT: Mucous membranes pink and moist.  NECK: Supple  LUNGS: Airway patent. Non-labored respirations. Breath sounds clear with good air entry bilaterally. HEART: Mild tachycardia, regular rhythm. . No peripheral edema  ABDOMEN: Non-distended, diffusely tender, without guarding or rebound. SKIN:  warm, dry  EXTREMITIES: Without swelling, tenderness or deformity, symmetric with normal ROM  NEUROLOGICAL: Alert, oriented      Diagnostic Study Results     Labs -     Recent Results (from the past 12 hour(s))   EKG, 12 LEAD, INITIAL    Collection Time: 02/21/22  3:02 PM   Result Value Ref Range    Ventricular Rate 96 BPM    Atrial Rate 96 BPM    P-R Interval 152 ms    QRS Duration 70 ms    Q-T Interval 348 ms    QTC Calculation (Bezet) 439 ms    Calculated P Axis 60 degrees    Calculated R Axis 30 degrees    Calculated T Axis 48 degrees    Diagnosis       Normal sinus rhythm  Possible Left atrial enlargement  No previous ECGs available     CBC WITH AUTOMATED DIFF    Collection Time: 02/21/22  3:05 PM   Result Value Ref Range    WBC 7.6 3.6 - 11.0 K/uL    RBC 4.76 3.80 - 5.20 M/uL    HGB 14.3 11.5 - 16.0 g/dL    HCT 43.6 35.0 - 47.0 %    MCV 91.6 80.0 - 99.0 FL    MCH 30.0 26.0 - 34.0 PG    MCHC 32.8 30.0 - 36.5 g/dL    RDW 12.3 11.5 - 14.5 %    PLATELET 048 386 - 215 K/uL    MPV 11.1 8.9 - 12.9 FL    NRBC 0.0 0  WBC    ABSOLUTE NRBC 0.00 0.00 - 0.01 K/uL    NEUTROPHILS 64 32 - 75 %    LYMPHOCYTES 27 12 - 49 %    MONOCYTES 6 5 - 13 %    EOSINOPHILS 2 0 - 7 %    BASOPHILS 1 0 - 1 %    IMMATURE GRANULOCYTES 0 0.0 - 0.5 %    ABS. NEUTROPHILS 4.8 1.8 - 8.0 K/UL    ABS. LYMPHOCYTES 2.0 0.8 - 3.5 K/UL    ABS. MONOCYTES 0.5 0.0 - 1.0 K/UL    ABS. EOSINOPHILS 0.2 0.0 - 0.4 K/UL    ABS. BASOPHILS 0.1 0.0 - 0.1 K/UL    ABS. IMM.  GRANS. 0.0 0.00 - 0.04 K/UL    DF AUTOMATED     METABOLIC PANEL, COMPREHENSIVE    Collection Time: 02/21/22  3:05 PM   Result Value Ref Range    Sodium 136 136 - 145 mmol/L    Potassium 4.6 3.5 - 5.1 mmol/L    Chloride 101 97 - 108 mmol/L    CO2 28 21 - 32 mmol/L    Anion gap 7 5 - 15 mmol/L    Glucose 109 (H) 65 - 100 mg/dL    BUN 19 6 - 20 MG/DL    Creatinine 1.17 (H) 0.55 - 1.02 MG/DL    BUN/Creatinine ratio 16 12 - 20      GFR est AA 58 (L) >60 ml/min/1.73m2    GFR est non-AA 48 (L) >60 ml/min/1.73m2    Calcium 9.0 8.5 - 10.1 MG/DL    Bilirubin, total 0.4 0.2 - 1.0 MG/DL    ALT (SGPT) 49 12 - 78 U/L    AST (SGOT) 32 15 - 37 U/L    Alk. phosphatase 81 45 - 117 U/L    Protein, total 7.8 6.4 - 8.2 g/dL    Albumin 3.7 3.5 - 5.0 g/dL    Globulin 4.1 (H) 2.0 - 4.0 g/dL    A-G Ratio 0.9 (L) 1.1 - 2.2     LIPASE    Collection Time: 02/21/22  3:05 PM   Result Value Ref Range    Lipase 1,006 (H) 73 - 393 U/L   URINALYSIS W/ RFLX MICROSCOPIC    Collection Time: 02/21/22  7:39 PM   Result Value Ref Range    Color YELLOW/STRAW      Appearance TURBID (A) CLEAR      Specific gravity 1.028 1.003 - 1.030      pH (UA) 5.5 5.0 - 8.0      Protein Negative NEG mg/dL    Glucose Negative NEG mg/dL    Ketone Negative NEG mg/dL    Bilirubin Negative NEG      Blood Negative NEG      Urobilinogen 1.0 0.2 - 1.0 EU/dL    Nitrites Negative NEG      Leukocyte Esterase SMALL (A) NEG      WBC 10-20 0 - 4 /hpf    RBC 0-5 0 - 5 /hpf    Epithelial cells MANY (A) FEW /lpf    Bacteria 1+ (A) NEG /hpf    Mucus TRACE (A) NEG /lpf       Radiologic Studies -   CT ABD PELV W CONT   Final Result   1. No acute findings in the abdomen or pelvis. 2. Colonic diverticulosis. 3. Hepatic steatosis. .              CT Results  (Last 48 hours)               02/21/22 1752  CT ABD PELV W CONT Final result    Impression:  1. No acute findings in the abdomen or pelvis. 2. Colonic diverticulosis. 3. Hepatic steatosis. .               Narrative:  EXAMINATION:  CT ABD PELV W CONT   INDICATION:  diffuse abd pain   COMPARISON: 2/13/2015. CONTRAST: 100 mL of Isovue-370. TECHNIQUE:    Multislice helical CT was performed from the diaphragm to the symphysis pubis   during uneventful rapid bolus intravenous contrast administration. Oral contrast   was not administered. Axial images were acquired. Lung and soft tissue windows   were generated.  Coronal and sagittal reformations were generated. CT dose   reduction was achieved through use of a standardized protocol tailored for this   examination and automatic exposure control for dose modulation. FINDINGS:   LOWER CHEST: The visualized portions of the lung bases are clear. LIVER: Hepatic steatosis which appears improved since 2015. Mild hepatomegaly. No focal lesions. GALLBLADDER: No calcified stones. No biliary dilatation. SPLEEN: No mass. Normal size. PANCREAS: No mass or ductal dilatation. ADRENALS: Unremarkable. KIDNEYS: No mass, calculus, or hydronephrosis. STOMACH: Unremarkable. SMALL BOWEL: No dilatation or wall thickening. COLON: No dilatation or wall thickening. Diverticulosis. APPENDIX: Not clearly seen but not distended. PERITONEUM: No ascites or pneumoperitoneum. RETROPERITONEUM: No lymphadenopathy or aortic aneurysm. REPRODUCTIVE ORGANS: Previous cholecystectomy. Well-defined right adnexal cyst   with a maximum diameter of 2.4 cm, likely incidental.   URINARY BLADDER: Unchanged slight thickening of the urachal remnant. No bladder   calculi or mass. PELVIC LYMPH NODES: None enlarged. BONES: Scoliosis and degenerative changes in the spine. .   ADDITIONAL COMMENTS:  N/A               CXR Results  (Last 48 hours)    None            Medical Decision Making     I, Asael Mercedes MD am the first provider for this patient and am the attending of record for this patient encounter. I reviewed the vital signs, available nursing notes, past medical history, past surgical history, family history and social history. Vital Signs-Reviewed the patient's vital signs. Patient Vitals for the past 12 hrs:   Temp Pulse Resp BP SpO2   02/21/22 2030  93 17 127/68 94 %   02/21/22 1924 98.4 °F (36.9 °C) 93 19 126/77 94 %   02/21/22 1457 98 °F (36.7 °C) (!) 102 18 105/87 98 %           EKG interpretation: (Preliminary)  Rhythm: normal sinus rhythm; and regular .  Rate (approx.): 96; Axis: normal; P wave: normal; QRS interval: normal ; ST/T wave: normal;  was interpreted by Nicolás Hernandes MD,ED Provider. Records Reviewed: Nursing Notes and Old Medical Records    Provider Notes (Medical Decision Making): The patient presents with a chief complaint of abdominal pain. Differential diagnosis considered includes acute appendicitis, acute cholecystitis, pancreatitis, gastritis, PUD, diverticulitis, mesenteric ischemia, abdominal aortic aneurysm, bowel obstruction, enteritis, colitis, fecal impaction, volvulus, IBS, inflammatory bowel disease, specific food intolerance, peritonitis, perforated viscous, malignancy, UTI, abscess, and abdominal pain NOS. Pelvic source of pain was also considered including endometritis, dysmenorrhea, ovarian cyst, ovarian torsion, PID, TOA, cervicitis, vaginitis, or uterine fibroid. All labs and diagnostic studies were reviewed and interpreted by me. Labs are notable for elevated lipase CT scan showed no acute findings. ,    Patient was given several doses of IV pain medication, IV fluids and antiemetics with significant improvement in symptoms. Pt is tolerating po. The patient states that their symptoms have improved and she feels much better. There are no other new complaints at this time    I did discuss admission with the patient for pain control however patient would like to decline and she is feeling much better would like to be discharged. Nothing in the work-up that would suggest gallstone pancreatitis, otherwise etiology is unclear at this time. Vital signs were within acceptable limits at the time of discharge. Patient was in stable condition and felt to be reliable for outpatient management. There were no lab findings of immediate concern.   The patient was advised to return to the emergency room for acutely worsening pain, persistence of pain, fevers, bloody stools, intractable vomiting or bloody emesis, inability to tolerate food/liquids, syncope, or change in mental status. Otherwise, the patient is encouraged to follow-up with a primary care physician within the week if possible. .  The patient understood the work-up and assessment and had no further questions. The patient was discharged home in stable clinical condition. ED Course:   Initial assessment performed. The patients presenting problems have been discussed, and they are in agreement with the care plan formulated and outlined with them. I have encouraged them to ask questions as they arise throughout their visit. Medications   morphine injection 4 mg (4 mg IntraVENous Given 2/21/22 1735)   ondansetron (ZOFRAN) injection 4 mg (4 mg IntraVENous Given 2/21/22 1735)   sodium chloride 0.9 % bolus infusion 1,000 mL (0 mL IntraVENous IV Completed 2/21/22 1924)   iopamidoL (ISOVUE-370) 76 % injection 100 mL (100 mL IntraVENous Given 2/21/22 1752)   HYDROmorphone (DILAUDID) injection 0.5 mg (0.5 mg IntraVENous Given 2/21/22 1821)   HYDROmorphone (DILAUDID) injection 0.5 mg (0.5 mg IntraVENous Given 2/21/22 2027)         PROGRESS:  Porfirio Mcwilliams's  results have been reviewed with her. She has been counseled regarding her diagnosis. She verbally conveys understanding and agreement of the signs, symptoms, diagnosis, treatment and prognosis and additionally agrees to follow up as recommended with Dr. Bobby Dove MD in 24 - 48 hours. She also agrees with the care-plan and conveys that all of her questions have been answered. I have also put together some discharge instructions for her that include: 1) educational information regarding their diagnosis, 2) how to care for their diagnosis at home, as well a 3) list of reasons why they would want to return to the ED prior to their follow-up appointment, should their condition change. Disposition:  D/c    PLAN:  1. D/c, low fat diet, pain meds    Diagnosis     Clinical Impression:   1.  Acute pancreatitis, unspecified complication status, unspecified pancreatitis type    2. Nausea and vomiting, intractability of vomiting not specified, unspecified vomiting type    3. Urinary tract infection without hematuria, site unspecified        Please note that this dictation was completed with Dragon, computer voice recognition software. Quite often unanticipated grammatical, syntax, homophones, and other interpretive errors are inadvertently transcribed by the computer software. Please disregard these errors. Additionally, please excuse any errors that have escaped final proofreading.

## 2022-02-22 LAB
ATRIAL RATE: 96 BPM
CALCULATED P AXIS, ECG09: 60 DEGREES
CALCULATED R AXIS, ECG10: 30 DEGREES
CALCULATED T AXIS, ECG11: 48 DEGREES
DIAGNOSIS, 93000: NORMAL
P-R INTERVAL, ECG05: 152 MS
Q-T INTERVAL, ECG07: 348 MS
QRS DURATION, ECG06: 70 MS
QTC CALCULATION (BEZET), ECG08: 439 MS
VENTRICULAR RATE, ECG03: 96 BPM

## 2022-02-22 NOTE — ED NOTES
Report received from [facility]. This RN at the bedside to introduce self. Pt A&Ox4 (person, place, time, and situation). Respirations even and unlabored. Skin warm, dry, and appropriate for ethnicity. PMS intact. Pt on continuous monitor. VSS. NAD noted. Stretcher in low and locked position. Denies having any further needs at this time. Call light within reach.

## 2022-03-04 ENCOUNTER — TELEPHONE (OUTPATIENT)
Dept: SURGERY | Age: 57
End: 2022-03-04

## 2022-05-15 ENCOUNTER — HOSPITAL ENCOUNTER (EMERGENCY)
Age: 57
Discharge: HOME | End: 2022-05-15
Attending: EMERGENCY MEDICINE
Payer: MEDICAID

## 2022-05-15 VITALS
HEIGHT: 68 IN | SYSTOLIC BLOOD PRESSURE: 172 MMHG | BODY MASS INDEX: 33.34 KG/M2 | HEART RATE: 100 BPM | DIASTOLIC BLOOD PRESSURE: 84 MMHG | OXYGEN SATURATION: 97 % | RESPIRATION RATE: 19 BRPM | TEMPERATURE: 98.6 F | WEIGHT: 220 LBS

## 2022-05-15 DIAGNOSIS — N30.00 ACUTE CYSTITIS WITHOUT HEMATURIA: ICD-10-CM

## 2022-05-15 DIAGNOSIS — R32 URINARY INCONTINENCE, UNSPECIFIED TYPE: Primary | ICD-10-CM

## 2022-05-15 LAB
APPEARANCE UR: CLEAR
BACTERIA URNS QL MICRO: ABNORMAL /HPF
BILIRUB UR QL: NEGATIVE
COLOR UR: ABNORMAL
EPITH CASTS URNS QL MICRO: ABNORMAL /LPF
GLUCOSE UR STRIP.AUTO-MCNC: NEGATIVE MG/DL
HGB UR QL STRIP: NEGATIVE
KETONES UR QL STRIP.AUTO: NEGATIVE MG/DL
LEUKOCYTE ESTERASE UR QL STRIP.AUTO: ABNORMAL
NITRITE UR QL STRIP.AUTO: NEGATIVE
PH UR STRIP: 6 [PH] (ref 5–8)
PROT UR STRIP-MCNC: NEGATIVE MG/DL
RBC #/AREA URNS HPF: ABNORMAL /HPF (ref 0–5)
SP GR UR REFRACTOMETRY: 1.02
UA: UC IF INDICATED,UAUC: ABNORMAL
UROBILINOGEN UR QL STRIP.AUTO: 0.2 EU/DL (ref 0.2–1)
WBC URNS QL MICRO: ABNORMAL /HPF (ref 0–4)

## 2022-05-15 PROCEDURE — 81001 URINALYSIS AUTO W/SCOPE: CPT

## 2022-05-15 PROCEDURE — 99283 EMERGENCY DEPT VISIT LOW MDM: CPT

## 2022-05-15 RX ORDER — CEPHALEXIN 500 MG/1
500 CAPSULE ORAL 2 TIMES DAILY
Qty: 14 CAPSULE | Refills: 0 | Status: SHIPPED | OUTPATIENT
Start: 2022-05-15 | End: 2022-05-22

## 2022-05-15 NOTE — ED PROVIDER NOTES
EMERGENCY DEPARTMENT HISTORY AND PHYSICAL EXAM    Date: 5/15/2022  Patient Name: Dennise Barney    History of Presenting Illness     Chief Complaint   Patient presents with    Urinary Frequency     x 3 days, +urinary incontinence, denies dysuria. History Provided By: Patient    HPI: Dennise Barney is a 61 y.o. female with a PMH of hypertension, asthma, migraines, diabetes who presents with urinary frequency x3 days but feeling any that she was unable to pee. Patient states she went and got an over-the-counter Azo cranberry pills but now she is having urinary incontinence when standing. Patient states every time she states she can feel a gush of fluids coming out. She states she is wearing 2 pads at a time to prevent leakage. She denies any abdominal pain, dysuria, abdominal pain, nausea, vomiting, fever or chills. Patient does report that she has had mesh surgery twice in the past due to bladder prolapse in the last time they took the mesh out and used a skin graft. PCP: Liliana Garcia MD    Current Outpatient Medications   Medication Sig Dispense Refill    cephALEXin (Keflex) 500 mg capsule Take 1 Capsule by mouth two (2) times a day for 7 days. 14 Capsule 0    ondansetron hcl (Zofran) 4 mg tablet Take 1 Tablet by mouth every eight (8) hours as needed for Nausea. 20 Tablet 0    diazePAM (VALIUM) 10 mg tablet TAKE 1 TABLET TWICE A DAY 60 Tab 0    lidocaine (LIDODERM) 5 % Apply patch to the affected area for 12 hours a day and remove for 12 hours a day. 7 Each 0    HYDROmorphone (DILAUDID) 4 mg tablet Take 1 Tab by mouth every twelve (12) hours every twelve (12) hours. Max Daily Amount: 8 mg. 40 Tab 0    ibuprofen (MOTRIN) 800 mg tablet Take 1 Tab by mouth every eight (8) hours as needed for Pain.  40 Tab 3    VENTOLIN HFA 90 mcg/actuation inhaler INHALE 1 TO 2 PUFFS AS NEEDED EVERY 4 HOURS  2    benzonatate (TESSALON) 100 mg capsule TAKE ONE CAPSULE BY MOUTH 3 TIMES A DAY FOR 10 DAYS  1  ADVANCED GLUC METER TEST STRIP strip USE TO TEST BLOOD SUGAR UP TO TWICE A DAY AS DIRECTED  11    NEXIUM 24HR 20 mg TbEC TAKE 1 TABLET BY MOUTH EVERY DAY  4    BASAGLAR KWIKPEN 100 unit/mL (3 mL) inpn INJECT 30 UNITS UNDER THE SKIN DAILY  5    metFORMIN (GLUCOPHAGE) 500 mg tablet TAKE 1 TABLET BY MOUTH TWICE A DAY WITH MEALS  3    methocarbamol (ROBAXIN) 500 mg tablet TAKE 2 TABLETS 4 TIMES A DAY AS NEEDED FOR MUSCLE SPASM  0    valsartan-hydroCHLOROthiazide (DIOVAN-HCT) 80-12.5 mg per tablet TAKE 1 TABLET BY MOUTH ONCE A DAY 30 DAY(S)  5    cyclobenzaprine (FLEXERIL) 10 mg tablet Take 1 Tab by mouth three (3) times daily as needed for Muscle Spasm(s). 20 Tab 0    lisinopril (PRINIVIL, ZESTRIL) 20 mg tablet Take 20 mg by mouth daily.  albuterol (PROVENTIL, VENTOLIN) 90 mcg/Actuation inhaler Take 2 Puffs by inhalation every six (6) hours as needed. Past History     Past Medical History:  Past Medical History:   Diagnosis Date    Asthma     Diabetes ([address]     Frequent headaches     Hypertension     Joint pain     Migraine     Muscle pain     Unintentional weight change        Past Surgical History:  Past Surgical History:   Procedure Laterality Date    HX GYN      hysterectomy    HX LUMBAR FUSION      HX ORTHOPAEDIC      knee surg    NEUROLOGICAL PROCEDURE UNLISTED  July 2010    lumbar spine surgery       Family History:  Family History   Problem Relation Age of Onset    Cancer Mother         lung       Social History:  Social History     Tobacco Use    Smoking status: Current Some Day Smoker    Smokeless tobacco: Never Used   Substance Use Topics    Alcohol use: No    Drug use: No       Allergies: Allergies   Allergen Reactions    Chantix [Varenicline] Itching    Gabapentin Swelling    Motrin [Ibuprofen] Swelling         Review of Systems   Review of Systems   Constitutional: Negative for chills and fever.    Gastrointestinal: Negative for abdominal pain, nausea and vomiting. Genitourinary: Positive for difficulty urinating and frequency. Negative for dysuria. Allergic/Immunologic: Negative for immunocompromised state. Neurological: Negative for speech difficulty and weakness. All other systems reviewed and are negative. Physical Exam     Vitals:    05/15/22 1612   BP: (!) 172/84   Pulse: 100   Resp: 19   Temp: 98.6 °F (37 °C)   SpO2: 97%   Weight: 99.8 kg (220 lb)   Height: 5' 8\" (1.727 m)     Physical Exam  Vitals and nursing note reviewed. Constitutional:       General: She is not in acute distress. Appearance: She is well-developed. HENT:      Head: Normocephalic and atraumatic. Eyes:      Conjunctiva/sclera: Conjunctivae normal.   Cardiovascular:      Rate and Rhythm: Normal rate and regular rhythm. Heart sounds: Normal heart sounds. Pulmonary:      Effort: Pulmonary effort is normal. No respiratory distress. Breath sounds: Normal breath sounds. No wheezing or rales. Abdominal:      General: Bowel sounds are normal. There is no distension. Palpations: Abdomen is soft. Tenderness: There is no abdominal tenderness. There is no guarding or rebound. Skin:     General: Skin is warm and dry. Neurological:      Mental Status: She is alert and oriented to person, place, and time. Psychiatric:         Behavior: Behavior normal.         Thought Content:  Thought content normal.         Judgment: Judgment normal.           Diagnostic Study Results     Labs -     Recent Results (from the past 12 hour(s))   URINALYSIS W/ REFLEX CULTURE    Collection Time: 05/15/22  4:42 PM    Specimen: Urine   Result Value Ref Range    Color YELLOW/STRAW      Appearance CLEAR CLEAR      Specific gravity 1.020      pH (UA) 6.0 5.0 - 8.0      Protein Negative NEG mg/dL    Glucose Negative NEG mg/dL    Ketone Negative NEG mg/dL    Bilirubin Negative NEG      Blood Negative NEG      Urobilinogen 0.2 0.2 - 1.0 EU/dL    Nitrites Negative NEG      Leukocyte Esterase TRACE (A) NEG      WBC 5-10 0 - 4 /hpf    RBC 0-5 0 - 5 /hpf    Epithelial cells FEW FEW /lpf    Bacteria 1+ (A) NEG /hpf    UA:UC IF INDICATED CULTURE NOT INDICATED BY UA RESULT CNI         Radiologic Studies -   No orders to display     CT Results  (Last 48 hours)    None        CXR Results  (Last 48 hours)    None            Medical Decision Making   I am the first provider for this patient. I reviewed the vital signs, available nursing notes, past medical history, past surgical history, family history and social history. Vital Signs-Reviewed the patient's vital signs. Records Reviewed: Nursing Notes and Old Medical Records    Provider Notes (Medical Decision Making):   Patient was presents with urinary frequency and incontinence x 3 days. DDx: Acute cystitis, bladder prolapse, stress v urge incontinence, previous repair complication. Will obtain UA. Discussed with the patient diagnosis and test results and all questioned fully answered. They understand the importance of staying well hydrated, taking antibiotics as prescribed to completion and using OTC pyridium as desired. Also advised that she needs to follow-up with your GYN to be evaluated for the incontinence as this may be a complication of her previous surgeries or any new incontinence that needs to be evaluated and treated in the office. We will send antibiotics since patient is symptomatic for UTI although UA only shows trace leuks, 5-10 WBCs and 1+ bacteria              Disposition:  Discharged    DISCHARGE NOTE:   7:35 PM      Care plan outlined and precautions discussed. Patient has no new complaints, changes, or physical findings. Results of UA were reviewed with the patient. All medications were reviewed with the patient; will d/c home. All of pt's questions and concerns were addressed. Patient was instructed and agrees to follow up with Urogyn, as well as to return to the ED upon further deterioration.  Patient is ready to go home. Follow-up Information     Follow up With Specialties Details Why Contact Info    Mel Deras MD Family Medicine  for referral to urology or gynecology [address] [phone]      [facility]  Schedule an appointment as soon as possible for a visit   [address] [facility] [address] [facility] [address] [facility]  Schedule an appointment as soon as possible for a visit   [address] [id number]          Current Discharge Medication List      START taking these medications    Details   cephALEXin (Keflex) 500 mg capsule Take 1 Capsule by mouth two (2) times a day for 7 days. Qty: 14 Capsule, Refills: 0  Start date: 5/15/2022, End date: 5/22/2022             Procedures:  Procedures    Please note that this dictation was completed with Dragon, computer voice recognition software. Quite often unanticipated grammatical, syntax, homophones, and other interpretive errors are inadvertently transcribed by the computer software. Please disregard these errors. Additionally, please excuse any errors that have escaped final proofreading. Diagnosis     Clinical Impression:   1. Urinary incontinence, unspecified type    2.  Acute cystitis without hematuria

## 2023-01-05 ENCOUNTER — HOSPITAL ENCOUNTER (EMERGENCY)
Age: 58
Discharge: HOME | End: 2023-01-05
Attending: STUDENT IN AN ORGANIZED HEALTH CARE EDUCATION/TRAINING PROGRAM
Payer: MEDICAID

## 2023-01-05 VITALS
WEIGHT: 225.31 LBS | RESPIRATION RATE: 20 BRPM | BODY MASS INDEX: 34.15 KG/M2 | DIASTOLIC BLOOD PRESSURE: 78 MMHG | HEART RATE: 94 BPM | HEIGHT: 68 IN | SYSTOLIC BLOOD PRESSURE: 157 MMHG | TEMPERATURE: 98.3 F | OXYGEN SATURATION: 95 %

## 2023-01-05 DIAGNOSIS — Z29.14 ENCOUNTER FOR PROPHYLACTIC RABIES IMMUNE GLOBIN: ICD-10-CM
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
## 2023-01-05 PROCEDURE — 90675 RABIES VACCINE IM: CPT | Performed by: STUDENT IN AN ORGANIZED HEALTH CARE EDUCATION/TRAINING PROGRAM

## 2023-01-05 PROCEDURE — 90471 IMMUNIZATION ADMIN: CPT

## 2023-01-05 PROCEDURE — 90375 RABIES IG IM/SC: CPT | Performed by: STUDENT IN AN ORGANIZED HEALTH CARE EDUCATION/TRAINING PROGRAM

## 2023-01-05 PROCEDURE — 74011250636 HC RX REV CODE- 250/636: Performed by: STUDENT IN AN ORGANIZED HEALTH CARE EDUCATION/TRAINING PROGRAM

## 2023-01-05 PROCEDURE — 96372 THER/PROPH/DIAG INJ SC/IM: CPT

## 2023-01-05 PROCEDURE — 74011000250 HC RX REV CODE- 250: Performed by: STUDENT IN AN ORGANIZED HEALTH CARE EDUCATION/TRAINING PROGRAM

## 2023-01-05 PROCEDURE — 74011250637 HC RX REV CODE- 250/637: Performed by: STUDENT IN AN ORGANIZED HEALTH CARE EDUCATION/TRAINING PROGRAM

## 2023-01-05 PROCEDURE — 90472 IMMUNIZATION ADMIN EACH ADD: CPT

## 2023-01-05 PROCEDURE — 99284 EMERGENCY DEPT VISIT MOD MDM: CPT

## 2023-01-05 PROCEDURE — 90714 TD VACC NO PRESV 7 YRS+ IM: CPT | Performed by: STUDENT IN AN ORGANIZED HEALTH CARE EDUCATION/TRAINING PROGRAM

## 2023-01-05 RX ORDER — ACETAMINOPHEN 500 MG
1000 TABLET ORAL ONCE
Status: COMPLETED | OUTPATIENT
Start: 2023-01-05 | End: 2023-01-05

## 2023-01-05 RX ORDER — AMOXICILLIN AND CLAVULANATE POTASSIUM 875; 125 MG/1; MG/1
1 TABLET, FILM COATED ORAL 2 TIMES DAILY
Qty: 10 TABLET | Refills: 0 | Status: SHIPPED | OUTPATIENT
Start: 2023-01-05 | End: 2023-01-05 | Stop reason: SDUPTHER

## 2023-01-05 RX ORDER — AMOXICILLIN AND CLAVULANATE POTASSIUM 875; 125 MG/1; MG/1
1 TABLET, FILM COATED ORAL
Status: COMPLETED | OUTPATIENT
Start: 2023-01-05 | End: 2023-01-05

## 2023-01-05 RX ORDER — AMOXICILLIN AND CLAVULANATE POTASSIUM 875; 125 MG/1; MG/1
1 TABLET, FILM COATED ORAL 2 TIMES DAILY
Qty: 10 TABLET | Refills: 0 | Status: SHIPPED | OUTPATIENT
Start: 2023-01-05 | End: 2023-01-10

## 2023-01-05 RX ORDER — BACITRACIN 500 UNIT/G
3 PACKET (EA) TOPICAL
Status: COMPLETED | OUTPATIENT
Start: 2023-01-05 | End: 2023-01-05

## 2023-01-05 RX ORDER — OXYCODONE HYDROCHLORIDE 5 MG/1
5 TABLET ORAL
Status: COMPLETED | OUTPATIENT
Start: 2023-01-05 | End: 2023-01-05

## 2023-01-05 RX ADMIN — CLOSTRIDIUM TETANI TOXOID ANTIGEN (FORMALDEHYDE INACTIVATED) AND CORYNEBACTERIUM DIPHTHERIAE TOXOID ANTIGEN (FORMALDEHYDE INACTIVATED) 0.5 ML: 5; 2 INJECTION, SUSPENSION INTRAMUSCULAR at 16:35

## 2023-01-05 RX ADMIN — ACETAMINOPHEN 1000 MG: 500 TABLET ORAL at 16:33

## 2023-01-05 RX ADMIN — RABIES VACCINE 2.5 UNITS: KIT at 16:38

## 2023-01-05 RX ADMIN — Medication 3 PACKET: at 17:47

## 2023-01-05 RX ADMIN — AMOXICILLIN AND CLAVULANATE POTASSIUM 1 TABLET: 875; 125 TABLET, FILM COATED ORAL at 16:33

## 2023-01-05 RX ADMIN — OXYCODONE 5 MG: 5 TABLET ORAL at 16:33

## 2023-01-05 RX ADMIN — RABIES IMMUNE GLOBULIN (HUMAN) 540 UNITS: 300 INJECTION, SOLUTION INFILTRATION; INTRAMUSCULAR at 17:24

## 2023-01-05 RX ADMIN — RABIES IMMUNE GLOBULIN (HUMAN) 1500 UNITS: 300 INJECTION, SOLUTION INFILTRATION; INTRAMUSCULAR at 17:23

## 2023-01-05 NOTE — ED TRIAGE NOTES
Patient arrives stating she was attacked by a feral cat, bites to L arm, R inner thigh and ankles. Scatches to L thigh.

## 2023-01-05 NOTE — ED NOTES
Patient's wounds cleansed with wound cleanser, then bacitracin applied, then non-adherent dressing, covered in rolled gauze and secured with coban

## 2023-01-06 NOTE — PROGRESS NOTES
CARE MANAGEMENT NOTE      CM received consult to make rabies follow up post exposure vaccines. Appt were made for the following date/time locations with [facility] at [address]:    1/8: Antonina Starkey at [address] [facility] [address]  1/12: Pediatric Outpatient [facility] at [facility] [address]  1/19: Pediatric Outpatient [facility] at [address] spoke with Pt to update on appt date, time, and location. CM also provided Pt with location address and phone number. Pt voiced understanding and denied additional questions.    _____________________________________  Gunner Alex [facility] Management   Available via [facility]  1/6/2023   10:55 AM

## 2023-01-06 NOTE — ED PROVIDER NOTES
Patient is a 54-year-old female presenting to the ED with EMS after being attacked by several feral cats near her trash can near her house. EMS gave 100 mcgs of fentanyl for pain. Patient has scratches on her leg around the knee and upper leg, likely bite to the upper groin on the right side scratches in the left arm. Cats are feral but he did not up-to-date on vaccines. Patient does not remember her last Tdap. Patient has history of diabetes. Animal controls been contacted by patient. The history is provided by the patient and the EMS personnel.    Cat scratch     Cat bite        Past Medical History:   Diagnosis Date    Asthma     Diabetes (HCC)     Frequent headaches     Hypertension     Joint pain     Migraine     Muscle pain     Unintentional weight change        Past Surgical History:   Procedure Laterality Date    HX GYN      hysterectomy    HX LUMBAR FUSION      HX ORTHOPAEDIC      knee surg    NEUROLOGICAL PROCEDURE UNLISTED  July 2010    lumbar spine surgery         Family History:   Problem Relation Age of Onset    Cancer Mother         lung       Social History     Socioeconomic History    Marital status: SINGLE     Spouse name: Not on file    Number of children: Not on file    Years of education: Not on file    Highest education level: Not on file   Occupational History    Not on file   Tobacco Use    Smoking status: Some Days    Smokeless tobacco: Never   Substance and Sexual Activity    Alcohol use: No    Drug use: No    Sexual activity: Never   Other Topics Concern    Not on file   Social History Narrative    Not on file     Social Determinants of Health     Financial Resource Strain: Not on file   Food Insecurity: Not on file   Transportation Needs: Not on file   Physical Activity: Not on file   Stress: Not on file   Social Connections: Not on file   Intimate Partner Violence: Not on file   Housing Stability: Not on file         ALLERGIES: Chantix [varenicline], Gabapentin, and Motrin [ibuprofen]    Review of Systems   Skin:  Positive for wound. Vitals:    01/05/23 1549   BP: (!) 157/78   Pulse: 94   Resp: 20   Temp: 98.3 °F (36.8 °C)   SpO2: 95%   Weight: 102.2 kg (225 lb 5 oz)   Height: 5' 8\" (1.727 m)            Physical Exam  Vitals and nursing note reviewed. Constitutional:       Appearance: Normal appearance. HENT:      Head: Normocephalic and atraumatic. Right Ear: External ear normal.      Left Ear: External ear normal.   Eyes:      Conjunctiva/sclera: Conjunctivae normal.   Cardiovascular:      Rate and Rhythm: Normal rate and regular rhythm. Pulses: Normal pulses. Heart sounds: Normal heart sounds. Pulmonary:      Effort: Pulmonary effort is normal.      Breath sounds: Normal breath sounds. Chest:      Chest wall: No deformity or tenderness. Abdominal:      Palpations: Abdomen is soft. Tenderness: There is no abdominal tenderness. Musculoskeletal:         General: No deformity or signs of injury. Normal range of motion. Skin:     General: Skin is warm and dry. Coloration: Skin is not jaundiced. Neurological:      General: No focal deficit present. Mental Status: She is alert and oriented to person, place, and time. Psychiatric:         Mood and Affect: Mood normal.         Behavior: Behavior normal.        Medical Decision Making  Risk  OTC drugs. Prescription drug management.            No identified or reported concerning Social Determinants of Health    Financial Resource Strain: yes  Food Insecurity: no  Transportation Needs: no  Stress: yes  Difficulty With Social Connections: no  Intimate Partner Violence: no  Housing Instability: no    MEDICATIONS GIVEN:  Medications   tetanus-diphtheria toxids-td 5-2 Lf unit/0.5 mL injection 0.5 mL (0.5 mL IntraMUSCular Given 1/5/23 1635)   rabies vaccine, PCEC (RABAVERT) kit 2.5 Units (2.5 Units IntraMUSCular Given 1/5/23 1638)   amoxicillin-clavulanate (AUGMENTIN) 875-125 mg per tablet 1 Tablet (1 Tablet Oral Given 1/5/23 1633)   oxyCODONE IR (ROXICODONE) tablet 5 mg (5 mg Oral Given 1/5/23 1633)   acetaminophen (TYLENOL) tablet 1,000 mg (1,000 mg Oral Given 1/5/23 1633)   rabies immune globulin (PF) (HYPERRAB) injection 1,500 Units (1,500 Units Infiltration Given 1/5/23 1723)     And   rabies immune globulin (PF) (HYPERRAB) injection 540 Units (540 Units Infiltration Given by Provider 1/5/23 1724)   bacitracin 500 unit/gram packet 3 Packet (3 Packets Topical Given 1/5/23 1747)       Differential diagnosis: Cat bite, cat scratch, risk of rabies, need for tetanus update    MDM: Patient is a 21-year-old female who was attacked by multiple feral cats resulting in stretches to the legs arm as well as a bite to the groin requiring rabies immunoglobulin, rabies vaccine, prophylactic antibiotics and wound irrigation and care. Case management was called to arrange for follow-up vaccinations. Tdap given here in ED. Further personalized recommendations for outpatient care as below. Key discharge instructions and summary of care: You presented to the ED after being attacked by several feral cats. Based on the bites to your thigh and possible posterior legs rabies immunoglobulin was given as well as rabies vaccine. Wounds were cleaned and bacitracin was placed. Augmentin given as antibiotic prophylaxis. A prescription was written for 3 additional doses of rabies vaccines on days 3 7 and 14. Case management will call you to set up outpatient rabies vaccination. If you are not contacted by case management you may return to the ED for vaccination as needed. Recommend applying bacitracin to the wounds once to twice a day, this is available over-the-counter. The patient has been re-evaluated and feeling better. Patient is stable for discharge. All available radiology and laboratory results have been reviewed with patient and/or available family.   Patient and/or family verbally conveyed their understanding and agreement of the patient's signs, symptoms, diagnosis, treatment and prognosis and additionally agree to follow-up as recommended in the discharge instructions or to return to the Emergency Department should their condition change or worsen prior to their follow-up appointment. All questions have been answered and patient and/or available family express understanding. IMPRESSION:  1. Cat bite, initial encounter    2. Cat scratch    3. Encounter for prophylactic rabies immune globin        DISPOSITION: Discharged    Chidi Love MD    Procedures

## 2024-09-20 ENCOUNTER — HOSPITAL ENCOUNTER (EMERGENCY)
Facility: HOSPITAL | Age: 59
Discharge: HOME | End: 2024-09-20
Attending: EMERGENCY MEDICINE
Payer: MEDICAID

## 2024-09-20 VITALS
TEMPERATURE: 98.8 F | HEIGHT: 68 IN | OXYGEN SATURATION: 96 % | HEART RATE: 83 BPM | SYSTOLIC BLOOD PRESSURE: 168 MMHG | BODY MASS INDEX: 33.34 KG/M2 | RESPIRATION RATE: 20 BRPM | WEIGHT: 220 LBS | DIASTOLIC BLOOD PRESSURE: 84 MMHG
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
## 2024-09-20 DIAGNOSIS — L03.113 CELLULITIS OF RIGHT HAND: ICD-10-CM

## 2024-09-20 PROCEDURE — 99283 EMERGENCY DEPT VISIT LOW MDM: CPT

## 2024-09-20 PROCEDURE — 6370000000 HC RX 637 (ALT 250 FOR IP): Performed by: EMERGENCY MEDICINE

## 2024-09-20 RX ORDER — ACETAMINOPHEN 325 MG/1
650 TABLET ORAL EVERY 6 HOURS PRN
Qty: 120 TABLET | Refills: 3 | Status: SHIPPED | OUTPATIENT
Start: 2024-09-20

## 2024-09-20 RX ORDER — QUETIAPINE FUMARATE 25 MG/1
TABLET, FILM COATED ORAL
COMMUNITY
Start: 2024-08-29

## 2024-09-20 RX ORDER — SEMAGLUTIDE 0.68 MG/ML
INJECTION, SOLUTION SUBCUTANEOUS
COMMUNITY
Start: 2024-09-04

## 2024-09-20 RX ORDER — BUPROPION HYDROCHLORIDE 300 MG/1
TABLET ORAL
COMMUNITY
Start: 2024-08-29

## 2024-09-20 RX ORDER — CODEINE PHOSPHATE AND GUAIFENESIN 10; 100 MG/5ML; MG/5ML
SOLUTION ORAL
COMMUNITY
Start: 2024-08-29

## 2024-09-20 RX ORDER — OXYCODONE AND ACETAMINOPHEN 5; 325 MG/1; MG/1
2 TABLET ORAL
Status: COMPLETED | OUTPATIENT
Start: 2024-09-20 | End: 2024-09-20

## 2024-09-20 RX ADMIN — OXYCODONE HYDROCHLORIDE AND ACETAMINOPHEN 2 TABLET: 5; 325 TABLET ORAL at 10:11

## 2024-09-20 RX ADMIN — AMOXICILLIN AND CLAVULANATE POTASSIUM 1 TABLET: 875; 125 TABLET, FILM COATED ORAL at 10:11

## 2024-09-20 ASSESSMENT — LIFESTYLE VARIABLES
HOW MANY STANDARD DRINKS CONTAINING ALCOHOL DO YOU HAVE ON A TYPICAL DAY: PATIENT DOES NOT DRINK
HOW OFTEN DO YOU HAVE A DRINK CONTAINING ALCOHOL: NEVER

## 2024-09-20 ASSESSMENT — PAIN DESCRIPTION - DESCRIPTORS: DESCRIPTORS: ACHING;DISCOMFORT

## 2024-09-20 ASSESSMENT — PAIN DESCRIPTION - ORIENTATION: ORIENTATION: RIGHT

## 2024-09-20 ASSESSMENT — PAIN DESCRIPTION - PAIN TYPE: TYPE: ACUTE PAIN

## 2024-09-20 ASSESSMENT — PAIN DESCRIPTION - FREQUENCY: FREQUENCY: INTERMITTENT

## 2024-09-20 ASSESSMENT — PAIN DESCRIPTION - LOCATION: LOCATION: HAND

## 2024-09-20 ASSESSMENT — PAIN SCALES - GENERAL
PAINLEVEL_OUTOF10: 10

## 2025-06-23 ENCOUNTER — APPOINTMENT (OUTPATIENT)
Facility: HOSPITAL | Age: 60
End: 2025-06-23
Payer: MEDICAID

## 2025-06-23 ENCOUNTER — HOSPITAL ENCOUNTER (EMERGENCY)
Facility: HOSPITAL | Age: 60
Discharge: HOME | End: 2025-06-23
Attending: STUDENT IN AN ORGANIZED HEALTH CARE EDUCATION/TRAINING PROGRAM
Payer: MEDICAID

## 2025-06-23 VITALS
TEMPERATURE: 98.9 F | WEIGHT: 224.87 LBS | SYSTOLIC BLOOD PRESSURE: 162 MMHG | HEART RATE: 86 BPM | OXYGEN SATURATION: 97 % | DIASTOLIC BLOOD PRESSURE: 93 MMHG | RESPIRATION RATE: 18 BRPM | BODY MASS INDEX: 34.19 KG/M2

## 2025-06-23 DIAGNOSIS — R10.11 ABDOMINAL PAIN, RIGHT UPPER QUADRANT: Primary | ICD-10-CM

## 2025-06-23 DIAGNOSIS — N83.201 RIGHT OVARIAN CYST: ICD-10-CM

## 2025-06-23 LAB
ALBUMIN SERPL-MCNC: 3.6 G/DL (ref 3.5–5)
ALBUMIN/GLOB SERPL: 0.9 (ref 1.1–2.2)
ALP SERPL-CCNC: 94 U/L (ref 45–117)
ALT SERPL-CCNC: 47 U/L (ref 12–78)
ANION GAP SERPL CALC-SCNC: 8 MMOL/L (ref 2–12)
APPEARANCE UR: ABNORMAL
AST SERPL-CCNC: 37 U/L (ref 15–37)
BACTERIA URNS QL MICRO: NEGATIVE /HPF
BASOPHILS # BLD: 0.05 K/UL (ref 0–0.1)
BASOPHILS NFR BLD: 0.7 % (ref 0–1)
BILIRUB SERPL-MCNC: 0.6 MG/DL (ref 0.2–1)
BILIRUB UR QL: NEGATIVE
BUN SERPL-MCNC: 18 MG/DL (ref 6–20)
BUN/CREAT SERPL: 15 (ref 12–20)
CALCIUM SERPL-MCNC: 8.9 MG/DL (ref 8.5–10.1)
CHLORIDE SERPL-SCNC: 102 MMOL/L (ref 97–108)
CO2 SERPL-SCNC: 30 MMOL/L (ref 21–32)
COLOR UR: ABNORMAL
CREAT SERPL-MCNC: 1.24 MG/DL (ref 0.55–1.02)
DIFFERENTIAL METHOD BLD: ABNORMAL
EOSINOPHIL # BLD: 0.19 K/UL (ref 0–0.4)
EOSINOPHIL NFR BLD: 2.7 % (ref 0–7)
EPITH CASTS URNS QL MICRO: ABNORMAL /LPF
ERYTHROCYTE [DISTWIDTH] IN BLOOD BY AUTOMATED COUNT: 12.2 % (ref 11.5–14.5)
GLOBULIN SER CALC-MCNC: 4 G/DL (ref 2–4)
GLUCOSE SERPL-MCNC: 86 MG/DL (ref 65–100)
GLUCOSE UR STRIP.AUTO-MCNC: NEGATIVE MG/DL
HCT VFR BLD AUTO: 39.1 % (ref 35–47)
HGB BLD-MCNC: 13.7 G/DL (ref 11.5–16)
HGB UR QL STRIP: NEGATIVE
IMM GRANULOCYTES # BLD AUTO: 0.02 K/UL (ref 0–0.04)
IMM GRANULOCYTES NFR BLD AUTO: 0.3 % (ref 0–0.5)
KETONES UR QL STRIP.AUTO: ABNORMAL MG/DL
LEUKOCYTE ESTERASE UR QL STRIP.AUTO: ABNORMAL
LIPASE SERPL-CCNC: 78 U/L (ref 13–75)
LYMPHOCYTES # BLD: 1.9 K/UL (ref 0.8–3.5)
LYMPHOCYTES NFR BLD: 26.8 % (ref 12–49)
MCH RBC QN AUTO: 30.6 PG (ref 26–34)
MCHC RBC AUTO-ENTMCNC: 35 G/DL (ref 30–36.5)
MCV RBC AUTO: 87.5 FL (ref 80–99)
MONOCYTES # BLD: 0.4 K/UL (ref 0–1)
MONOCYTES NFR BLD: 5.6 % (ref 5–13)
NEUTS SEG # BLD: 4.54 K/UL (ref 1.8–8)
NEUTS SEG NFR BLD: 63.9 % (ref 32–75)
NITRITE UR QL STRIP.AUTO: NEGATIVE
NRBC # BLD: 0 K/UL (ref 0–0.01)
NRBC BLD-RTO: 0 PER 100 WBC
PH UR STRIP: 5.5 (ref 5–8)
PLATELET # BLD AUTO: 77 K/UL (ref 150–400)
PMV BLD AUTO: 12.1 FL (ref 8.9–12.9)
POTASSIUM SERPL-SCNC: 4.2 MMOL/L (ref 3.5–5.1)
PROT SERPL-MCNC: 7.6 G/DL (ref 6.4–8.2)
PROT UR STRIP-MCNC: 30 MG/DL
RBC # BLD AUTO: 4.47 M/UL (ref 3.8–5.2)
RBC #/AREA URNS HPF: ABNORMAL /HPF (ref 0–5)
SODIUM SERPL-SCNC: 140 MMOL/L (ref 136–145)
SP GR UR REFRACTOMETRY: 1.02
URINE CULTURE IF INDICATED: ABNORMAL
UROBILINOGEN UR QL STRIP.AUTO: 1 EU/DL (ref 0.2–1)
WBC # BLD AUTO: 7.1 K/UL (ref 3.6–11)
WBC URNS QL MICRO: ABNORMAL /HPF (ref 0–4)

## 2025-06-23 PROCEDURE — 96374 THER/PROPH/DIAG INJ IV PUSH: CPT

## 2025-06-23 PROCEDURE — 74177 CT ABD & PELVIS W/CONTRAST: CPT

## 2025-06-23 PROCEDURE — 96375 TX/PRO/DX INJ NEW DRUG ADDON: CPT

## 2025-06-23 PROCEDURE — 83690 ASSAY OF LIPASE: CPT

## 2025-06-23 PROCEDURE — 93005 ELECTROCARDIOGRAM TRACING: CPT | Performed by: STUDENT IN AN ORGANIZED HEALTH CARE EDUCATION/TRAINING PROGRAM

## 2025-06-23 PROCEDURE — 80053 COMPREHEN METABOLIC PANEL: CPT

## 2025-06-23 PROCEDURE — 36415 COLL VENOUS BLD VENIPUNCTURE: CPT

## 2025-06-23 PROCEDURE — 76705 ECHO EXAM OF ABDOMEN: CPT

## 2025-06-23 PROCEDURE — 96376 TX/PRO/DX INJ SAME DRUG ADON: CPT

## 2025-06-23 PROCEDURE — 2500000003 HC RX 250 WO HCPCS: Performed by: STUDENT IN AN ORGANIZED HEALTH CARE EDUCATION/TRAINING PROGRAM

## 2025-06-23 PROCEDURE — 6360000002 HC RX W HCPCS: Performed by: EMERGENCY MEDICINE

## 2025-06-23 PROCEDURE — 6360000002 HC RX W HCPCS: Performed by: STUDENT IN AN ORGANIZED HEALTH CARE EDUCATION/TRAINING PROGRAM

## 2025-06-23 PROCEDURE — 6360000004 HC RX CONTRAST MEDICATION: Performed by: EMERGENCY MEDICINE

## 2025-06-23 PROCEDURE — 2580000003 HC RX 258: Performed by: STUDENT IN AN ORGANIZED HEALTH CARE EDUCATION/TRAINING PROGRAM

## 2025-06-23 PROCEDURE — 81001 URINALYSIS AUTO W/SCOPE: CPT

## 2025-06-23 PROCEDURE — 99285 EMERGENCY DEPT VISIT HI MDM: CPT

## 2025-06-23 PROCEDURE — 85025 COMPLETE CBC W/AUTO DIFF WBC: CPT

## 2025-06-23 RX ORDER — ONDANSETRON 2 MG/ML
4 INJECTION INTRAMUSCULAR; INTRAVENOUS ONCE
Status: COMPLETED | OUTPATIENT
Start: 2025-06-23 | End: 2025-06-23

## 2025-06-23 RX ORDER — HYDROMORPHONE HYDROCHLORIDE 1 MG/ML
1 INJECTION, SOLUTION INTRAMUSCULAR; INTRAVENOUS; SUBCUTANEOUS
Status: COMPLETED | OUTPATIENT
Start: 2025-06-23 | End: 2025-06-23

## 2025-06-23 RX ORDER — HYDROMORPHONE HYDROCHLORIDE 1 MG/ML
0.5 INJECTION, SOLUTION INTRAMUSCULAR; INTRAVENOUS; SUBCUTANEOUS
Status: DISCONTINUED | OUTPATIENT
Start: 2025-06-23 | End: 2025-06-23

## 2025-06-23 RX ORDER — IOPAMIDOL 755 MG/ML
100 INJECTION, SOLUTION INTRAVASCULAR
Status: COMPLETED | OUTPATIENT
Start: 2025-06-23 | End: 2025-06-23

## 2025-06-23 RX ORDER — 0.9 % SODIUM CHLORIDE 0.9 %
1000 INTRAVENOUS SOLUTION INTRAVENOUS ONCE
Status: COMPLETED | OUTPATIENT
Start: 2025-06-23 | End: 2025-06-23

## 2025-06-23 RX ORDER — MORPHINE SULFATE 4 MG/ML
4 INJECTION, SOLUTION INTRAMUSCULAR; INTRAVENOUS
Status: COMPLETED | OUTPATIENT
Start: 2025-06-23 | End: 2025-06-23

## 2025-06-23 RX ORDER — MORPHINE SULFATE 4 MG/ML
4 INJECTION, SOLUTION INTRAMUSCULAR; INTRAVENOUS ONCE
Refills: 0 | Status: COMPLETED | OUTPATIENT
Start: 2025-06-23 | End: 2025-06-23

## 2025-06-23 RX ORDER — OXYCODONE AND ACETAMINOPHEN 5; 325 MG/1; MG/1
1 TABLET ORAL EVERY 6 HOURS PRN
Qty: 6 TABLET | Refills: 0 | Status: SHIPPED | OUTPATIENT
Start: 2025-06-23 | End: 2025-06-26

## 2025-06-23 RX ADMIN — ONDANSETRON 4 MG: 2 INJECTION, SOLUTION INTRAMUSCULAR; INTRAVENOUS at 14:34

## 2025-06-23 RX ADMIN — HYDROMORPHONE HYDROCHLORIDE 1 MG: 1 INJECTION, SOLUTION INTRAMUSCULAR; INTRAVENOUS; SUBCUTANEOUS at 16:00

## 2025-06-23 RX ADMIN — SODIUM CHLORIDE 1000 ML: 0.9 INJECTION, SOLUTION INTRAVENOUS at 15:12

## 2025-06-23 RX ADMIN — MORPHINE SULFATE 4 MG: 4 INJECTION INTRAVENOUS at 14:33

## 2025-06-23 RX ADMIN — FAMOTIDINE 20 MG: 10 INJECTION, SOLUTION INTRAVENOUS at 14:34

## 2025-06-23 RX ADMIN — IOPAMIDOL 100 ML: 755 INJECTION, SOLUTION INTRAVENOUS at 16:59

## 2025-06-23 RX ADMIN — MORPHINE SULFATE 4 MG: 4 INJECTION INTRAVENOUS at 17:27

## 2025-06-23 ASSESSMENT — PAIN DESCRIPTION - LOCATION
LOCATION: ABDOMEN
LOCATION: ABDOMEN;GENERALIZED

## 2025-06-23 ASSESSMENT — PAIN DESCRIPTION - PAIN TYPE: TYPE: ACUTE PAIN

## 2025-06-23 ASSESSMENT — PAIN DESCRIPTION - DESCRIPTORS
DESCRIPTORS: STABBING;SHARP
DESCRIPTORS: ACHING

## 2025-06-23 ASSESSMENT — PAIN SCALES - GENERAL
PAINLEVEL_OUTOF10: 9
PAINLEVEL_OUTOF10: 9

## 2025-06-23 ASSESSMENT — PAIN DESCRIPTION - ORIENTATION
ORIENTATION: LOWER;RIGHT;MID
ORIENTATION: ANTERIOR

## 2025-06-23 ASSESSMENT — PAIN - FUNCTIONAL ASSESSMENT: PAIN_FUNCTIONAL_ASSESSMENT: 0-10

## 2025-06-23 NOTE — ED PROVIDER NOTES
[facility] EMERGENCY DEPARTMENT  EMERGENCY DEPARTMENT ENCOUNTER       Pt Name: Luba Castro  MRN: [id number]  Birthdate 1965  Date of evaluation: 6/23/2025  Provider: Mehnaz Kennedy DO   PCP: Janie Presley MD  Note Started: 2:38 PM 6/23/25     CHIEF COMPLAINT       Chief Complaint   Patient presents with    Abdominal Pain        HISTORY OF PRESENT ILLNESS: 1 or more elements      History From: Patient  None     Luba Castro is a 60 y.o. female who presents with cc of RUQ/epigastric abdominal pain since Saturday. Endorses nausea, vomiting.      Nursing Notes were all reviewed and agreed with or any disagreements were addressed in the HPI.       PAST HISTORY     Past Medical History:  Past Medical History:   Diagnosis Date    Asthma     Diabetes (HCC)     Frequent headaches     Hypertension     Joint pain     Migraine     Muscle pain     Unintentional weight change          Past Surgical History:  Past Surgical History:   Procedure Laterality Date    GYN      hysterectomy    LUMBAR FUSION      NEUROLOGICAL SURGERY  July 2010    lumbar spine surgery    ORTHOPEDIC SURGERY      knee surg       Family History:  Family History   Problem Relation Age of Onset    Cancer Mother         lung       Social History:  Social History     Tobacco Use    Smoking status: Some Days    Smokeless tobacco: Never   Substance Use Topics    Alcohol use: No    Drug use: No       Allergies:  Allergies   Allergen Reactions    Gabapentin Swelling    Ibuprofen Swelling    Varenicline Itching       CURRENT MEDICATIONS      Previous Medications    ACETAMINOPHEN (AMINOFEN) 325 MG TABLET    Take 2 tablets by mouth every 6 hours as needed for Pain    ALBUTEROL SULFATE HFA (VENTOLIN HFA) 108 (90 BASE) MCG/ACT INHALER    INHALE 1 TO 2 PUFFS AS NEEDED EVERY 4 HOURS    BENZONATATE (TESSALON) 100 MG CAPSULE    TAKE ONE CAPSULE BY MOUTH 3 TIMES A DAY FOR 10 DAYS    BUPROPION (WELLBUTRIN XL) 300 MG EXTENDED RELEASE TABLET    TAKE 1 TABLET

## 2025-06-23 NOTE — ED TRIAGE NOTES
Pt presents ambulatory to ED complaining of sharp and stabbing abd pain all over x2 days . Pt reports taking BC for relief.    Emergency Department Nursing Plan of Care The Nursing Plan of Care is developed from the Nursing assessment and Emergency Department Attending provider initial evaluation. The plan of care may be reviewed in the “ED Provider note”.     The Plan of Care was developed with the following considerations:  Patient / Family readiness to learn indicated by:verbalized understanding  Persons(s) to be included in education: patient  Barriers to Learning/Limitations:None    Signed    Chani Fong RN   6/23/2025   2:30 PM

## 2025-06-24 LAB
EKG ATRIAL RATE: 79 BPM
EKG DIAGNOSIS: NORMAL
EKG P AXIS: 71 DEGREES
EKG P-R INTERVAL: 170 MS
EKG Q-T INTERVAL: 390 MS
EKG QRS DURATION: 74 MS
EKG QTC CALCULATION (BAZETT): 447 MS
EKG R AXIS: 59 DEGREES
EKG T AXIS: 32 DEGREES
EKG VENTRICULAR RATE: 79 BPM

## 2025-07-03 ENCOUNTER — OFFICE VISIT (OUTPATIENT)
Age: 60
End: 2025-07-03
Payer: MEDICAID

## 2025-07-03 VITALS
HEIGHT: 68 IN | TEMPERATURE: 98.5 F | BODY MASS INDEX: 32.74 KG/M2 | HEART RATE: 85 BPM | WEIGHT: 216 LBS | OXYGEN SATURATION: 96 % | DIASTOLIC BLOOD PRESSURE: 84 MMHG | RESPIRATION RATE: 16 BRPM | SYSTOLIC BLOOD PRESSURE: 152 MMHG

## 2025-07-03 DIAGNOSIS — D69.6 THROMBOCYTOPENIA: ICD-10-CM

## 2025-07-03 DIAGNOSIS — N83.201 CYST OF RIGHT OVARY: Primary | ICD-10-CM

## 2025-07-03 DIAGNOSIS — N95.2 VAGINAL ATROPHY: ICD-10-CM

## 2025-07-03 DIAGNOSIS — R10.33 PERIUMBILICAL ABDOMINAL PAIN: ICD-10-CM

## 2025-07-03 PROBLEM — J45.909 ASTHMA: Status: ACTIVE | Noted: 2025-07-03

## 2025-07-03 PROBLEM — F41.9 ANXIETY DISORDER, UNSPECIFIED: Status: ACTIVE | Noted: 2025-07-03

## 2025-07-03 PROBLEM — E66.01 MORBID (SEVERE) OBESITY DUE TO EXCESS CALORIES (HCC): Status: ACTIVE | Noted: 2025-07-03

## 2025-07-03 PROBLEM — K21.9 GERD (GASTROESOPHAGEAL REFLUX DISEASE): Status: ACTIVE | Noted: 2025-07-03

## 2025-07-03 PROBLEM — E78.2 MIXED HYPERLIPIDEMIA: Status: ACTIVE | Noted: 2025-07-03

## 2025-07-03 PROBLEM — E11.65 TYPE 2 DIABETES MELLITUS WITH HYPERGLYCEMIA (HCC): Status: ACTIVE | Noted: 2025-07-03

## 2025-07-03 PROBLEM — G43.009 MIGRAINE WITHOUT AURA AND WITHOUT STATUS MIGRAINOSUS, NOT INTRACTABLE: Status: ACTIVE | Noted: 2025-07-03

## 2025-07-03 PROBLEM — I10 HTN (HYPERTENSION), BENIGN: Status: ACTIVE | Noted: 2025-07-03

## 2025-07-03 LAB
BILIRUBIN, URINE, POC: NEGATIVE
BLOOD URINE, POC: NEGATIVE
GLUCOSE URINE, POC: NEGATIVE
KETONES, URINE, POC: NEGATIVE
LEUKOCYTE ESTERASE, URINE, POC: NEGATIVE
NITRITE, URINE, POC: NORMAL
PH, URINE, POC: 6 (ref 4.6–8)
PROTEIN,URINE, POC: NORMAL
SPECIFIC GRAVITY, URINE, POC: 1.03 (ref 1–1.03)
URINALYSIS CLARITY, POC: CLEAR
URINALYSIS COLOR, POC: YELLOW
UROBILINOGEN, POC: NORMAL MG/DL

## 2025-07-03 PROCEDURE — 3079F DIAST BP 80-89 MM HG: CPT | Performed by: OBSTETRICS & GYNECOLOGY

## 2025-07-03 PROCEDURE — 81002 URINALYSIS NONAUTO W/O SCOPE: CPT | Performed by: OBSTETRICS & GYNECOLOGY

## 2025-07-03 PROCEDURE — 99204 OFFICE O/P NEW MOD 45 MIN: CPT | Performed by: OBSTETRICS & GYNECOLOGY

## 2025-07-03 PROCEDURE — 3077F SYST BP >= 140 MM HG: CPT | Performed by: OBSTETRICS & GYNECOLOGY

## 2025-07-03 RX ORDER — ESTRADIOL 0.1 MG/G
CREAM VAGINAL
Qty: 42.5 G | Refills: 3 | Status: SHIPPED | OUTPATIENT
Start: 2025-07-03

## 2025-07-03 SDOH — ECONOMIC STABILITY: FOOD INSECURITY: WITHIN THE PAST 12 MONTHS, YOU WORRIED THAT YOUR FOOD WOULD RUN OUT BEFORE YOU GOT MONEY TO BUY MORE.: NEVER TRUE

## 2025-07-03 SDOH — ECONOMIC STABILITY: FOOD INSECURITY: WITHIN THE PAST 12 MONTHS, THE FOOD YOU BOUGHT JUST DIDN'T LAST AND YOU DIDN'T HAVE MONEY TO GET MORE.: NEVER TRUE

## 2025-07-03 ASSESSMENT — PATIENT HEALTH QUESTIONNAIRE - PHQ9
2. FEELING DOWN, DEPRESSED OR HOPELESS: NOT AT ALL
SUM OF ALL RESPONSES TO PHQ QUESTIONS 1-9: 0
1. LITTLE INTEREST OR PLEASURE IN DOING THINGS: NOT AT ALL
SUM OF ALL RESPONSES TO PHQ QUESTIONS 1-9: 0

## 2025-07-03 NOTE — PROGRESS NOTES
Chief Complaint   Patient presents with    New Patient    Follow-up       Luba Castro is a 60 y.o. female is being seen for a problem visit. Patient relays that she was in the ER for pain in her abdomen. She relays she was told that she had a cyst on her (R) ovary. She states she was under the impression that she had her ovaries out when she had her hysterectomy 10 years ago.        Ob/Gyn Hx:     - vaginal  No LMP recorded. (Menstrual status: Menopause).  Birth Control - hysterectomy  Sexually active- not currently     Health Maintenance:    Last Pap - relays that she is unsure      1. Have you been to the ER, urgent care clinic, or hospitalized since your last visit? new    2. Have you seen or consulted any other health care providers outside of the [facility] since your last visit? new    Exam chaperoned by:      Lexis Deleon LPN.

## 2025-07-04 LAB
ERYTHROCYTE [DISTWIDTH] IN BLOOD BY AUTOMATED COUNT: 12.4 % (ref 11.7–15.4)
HCT VFR BLD AUTO: 40.6 % (ref 34–46.6)
HGB BLD-MCNC: 13.6 G/DL (ref 11.1–15.9)
MCH RBC QN AUTO: 30.7 PG (ref 26.6–33)
MCHC RBC AUTO-ENTMCNC: 33.5 G/DL (ref 31.5–35.7)
MCV RBC AUTO: 92 FL (ref 79–97)
MORPHOLOGY BLD-IMP: NORMAL
PLATELET # BLD AUTO: 88 X10E3/UL (ref 150–450)
RBC # BLD AUTO: 4.43 X10E6/UL (ref 3.77–5.28)
WBC # BLD AUTO: 8.1 X10E3/UL (ref 3.4–10.8)

## 2025-07-05 LAB — BACTERIA UR CULT: NORMAL

## 2025-07-07 ENCOUNTER — RESULTS FOLLOW-UP (OUTPATIENT)
Age: 60
End: 2025-07-07

## 2025-07-07 DIAGNOSIS — D69.6 THROMBOCYTOPENIA: Primary | ICD-10-CM

## 2025-08-05 ENCOUNTER — TELEPHONE (OUTPATIENT)
Age: 60
End: 2025-08-05

## 2025-08-06 ENCOUNTER — TELEPHONE (OUTPATIENT)
Age: 60
End: 2025-08-06

## 2025-08-06 DIAGNOSIS — N83.201 CYST OF RIGHT OVARY: Primary | ICD-10-CM
